# Patient Record
Sex: FEMALE | Race: OTHER | Employment: OTHER | URBAN - METROPOLITAN AREA
[De-identification: names, ages, dates, MRNs, and addresses within clinical notes are randomized per-mention and may not be internally consistent; named-entity substitution may affect disease eponyms.]

---

## 2017-07-06 ENCOUNTER — OFFICE VISIT (OUTPATIENT)
Dept: INTERNAL MEDICINE CLINIC | Age: 67
End: 2017-07-06

## 2017-07-06 VITALS
HEART RATE: 67 BPM | RESPIRATION RATE: 17 BRPM | SYSTOLIC BLOOD PRESSURE: 138 MMHG | TEMPERATURE: 97 F | OXYGEN SATURATION: 99 % | WEIGHT: 115.4 LBS | HEIGHT: 62 IN | DIASTOLIC BLOOD PRESSURE: 69 MMHG | BODY MASS INDEX: 21.23 KG/M2
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
## 2017-07-06 DIAGNOSIS — R00.1 BRADYCARDIA: ICD-10-CM

## 2017-07-06 DIAGNOSIS — Z12.11 COLON CANCER SCREENING: ICD-10-CM

## 2017-07-06 DIAGNOSIS — E78.5 DYSLIPIDEMIA: ICD-10-CM

## 2017-07-06 DIAGNOSIS — Z87.898 HISTORY OF PREDIABETES: ICD-10-CM

## 2017-07-06 DIAGNOSIS — R00.2 HEART PALPITATIONS: ICD-10-CM

## 2017-07-06 DIAGNOSIS — M85.80 OSTEOPENIA, UNSPECIFIED LOCATION: ICD-10-CM

## 2017-07-06 DIAGNOSIS — Z11.59 NEED FOR HEPATITIS C SCREENING TEST: ICD-10-CM

## 2017-07-06 RX ORDER — ESTRADIOL 0.1 MG/G
CREAM VAGINAL
COMMUNITY
Start: 2017-06-09 | End: 2018-10-08

## 2017-07-06 NOTE — PROGRESS NOTES
Chief Complaint   Patient presents with   [facility] Establish Care     1. Have you been to the ER, urgent care clinic since your last visit? Hospitalized since your last visit? No    2. Have you seen or consulted any other health care providers outside of the [facility] since your last visit? Include any pap smears or colon screening.  No

## 2017-07-06 NOTE — PROGRESS NOTES
Chief Complaint   Patient presents with    Establish Care       Subjective:   Duc Dunbar 78 y.o.  female with a  past medical history reviewed see below. comes in today to re-establish care. She is upset as she was not seeing a doctor and was only seeing a NP and felt like a number at her old practice. Her health is good. Due pt a syncopal episode in 2014 after she left the hospital she was told to stop the Nathaniel Churchman  She had a heart loop monitor in 2013 and removed in 2015. She was told she has bradycardia as well. She is concerned about her blood sugars as well as she was told she was prediabetic and has been trying to control her diabetic intake and eating a semi vegitariean diet. Reviewed notes from chart see nursing notes - pt FELL twice in our waiting room  She has an appt for aug 8th for mammogram and dexa scan  She had coffee this am but no foods   She is still working as well and walking 10,000  To 12,000 steps  She has had some bit of stress with  dx with prostate cancer  Pt did not have a pacemaker but did have a ILR  (implantable loop recorder)   She was treated for a UTI and the pain was worse she went to a gynecoloigst for burning pain and saw her June 7th  She was dx with vaginal atrophy she was prescribed a small dose of estrogen     ROS: sometimes when she is home she feels like her heart is fluttering and skipping a beat she has no appt set up with cardiology and was told she has \"bradycardia\"   otherwise feeling generally well. All other systems reviewed and are negative      Current Outpatient Prescriptions   Medication Sig Dispense Refill    OMEGA-3S/DHA/EPA/FISH OIL (OMEGA 3 PO) Take 1 Cap by mouth two (2) times a day.  multivitamin (ONE A DAY) tablet Take 1 Tab by mouth daily.  aspirin 81 mg tablet Take 81 mg by mouth daily.  calcium-cholecalciferol, d3, (CALCIUM 600 + D) 600-125 mg-unit Tab Take 1 Tab by mouth every other day.        Allergies   Allergen Reactions    Codeine Other (comments)     DROP IN BP    Opioids - Morphine Analogues Other (comments)     BP drops.     Tape [Adhesive] Other (comments)     rash     Past Medical History:   Diagnosis Date    Arrhythmia     Hyperlipidemia     Osteopenia     Tendonitis of foot     acute L foot     Past Surgical History:   Procedure Laterality Date    BREAST SURGERY PROCEDURE UNLISTED      left breast     HX GYN      fibroid from uterus in 25    HX HEENT      wisdom teeth removed age 27    HX ORTHOPAEDIC      rotator cuff right shoulder     HX OTHER SURGICAL  01/2014    heart loop monitor    AK COLONOSCOPY FLX DX W/COLLJ SPEC WHEN PFRMD  2011    diverticulitis f/up 10 yrs      Family History   Problem Relation Age of Onset    Coronary Artery Disease Mother     Dementia Mother     Heart Disease Mother      cabg     Hypertension Mother     Headache Mother      a fib   Jacy Asa Elevated Lipids Mother     Coronary Artery Disease Father     Broken Bones Father     Cancer Father      prostate ca     Heart Disease Father     Hypertension Father     Coronary Artery Disease Brother     Hypertension Brother     Elevated Lipids Brother     Coronary Artery Disease Sister     Hypertension Sister     Elevated Lipids Sister     Cancer Sister      thyroid ca and breast ca     Heart Attack Maternal Aunt     Heart Attack Maternal Uncle     Cancer Maternal Uncle      breast     Broken Bones Paternal Aunt     Broken Bones Paternal Uncle     Hypertension Sister     Elevated Lipids Sister      Social History   Substance Use Topics    Smoking status: Never Smoker    Smokeless tobacco: Never Used    Alcohol use No          Objective:     Visit Vitals    Pulse 67    Temp 97 °F (36.1 °C) (Oral)    Resp 17    Ht 5' 2\" (1.575 m)    Wt 115 lb 6.4 oz (52.3 kg)    SpO2 99%    BMI 21.11 kg/m2     Gen: NAD, pleasant, thin   HEENT: normal appearing head, nares patent, PERRLA, EOMI, oropharynx no erythema, no cervical lymphadenopathy neck supple   Cardio: RRR nl S1S2 no murmur  Lungs CTAB no wheeze no rales no rhonchi  ABD Soft non tender non distended + bowel sounds  Extremities: full ROM X 4 no clubbing no cyanosis erythema on left knee and right elbow very mild no abrasion full rom mild crpkeitation noted in lef tknee   Neuro: no gross focal deficits noted, alert and orientated X 3  Psych.: well groomed no outward signs of depression. Assessment/Plan:   Dacia Major was seen today for establish care. Diagnoses and all orders for this visit:    Fall (on)(from) incline, initial encounter  -     HEMOGLOBIN A1C WITH EAG  -     LIPID PANEL  -     METABOLIC PANEL, COMPREHENSIVE  -     VITAMIN D, 25 HYDROXY    Bradycardia  -     HEMOGLOBIN A1C WITH EAG  -     LIPID PANEL  -     METABOLIC PANEL, COMPREHENSIVE  -     VITAMIN D, 25 HYDROXY  -     THYROID PANEL W/TSH  -     ANEMIA PROFILE A    Dyslipidemia  -     HEMOGLOBIN A1C WITH EAG  -     LIPID PANEL  -     METABOLIC PANEL, COMPREHENSIVE  -     VITAMIN D, 25 HYDROXY  -     THYROID PANEL W/TSH    Osteopenia, unspecified location  -     HEMOGLOBIN A1C WITH EAG  -     LIPID PANEL  -     METABOLIC PANEL, COMPREHENSIVE  -     VITAMIN D, 25 HYDROXY    History of prediabetes  -     HEMOGLOBIN A1C WITH EAG  -     LIPID PANEL  -     METABOLIC PANEL, COMPREHENSIVE  -     VITAMIN D, 25 HYDROXY    Heart palpitations  -     THYROID PANEL W/TSH  -     ANEMIA PROFILE A    Need for hepatitis C screening test  -     HEPATITIS C AB    Colon cancer screening  -     OCCULT BLOOD, IMMUNOASSAY (FIT)      Follow-up Disposition:  Return in about 3 weeks (around 7/27/2017) for review labs and medicare wellness visit 30 min please . .she is to continue the fish and the asa and multivitamin and caution with other herbal medication   avs printed and given to the pt. .  She still really does not want to use prescribed medications d/w pt her prediab has resolved based on chart review of her labs and with her diet changes do not suspect she still has prediabeties pt declines rx for bradycardia and encouraged to follow up with cardiology as she has not been there for about a yr    The patient voiced understanding of the above. Medication side effects were reviewed with the patient. Call with any concerns.

## 2017-07-06 NOTE — MR AVS SNAPSHOT
Visit Information Date & Time Provider Department Dept. Phone Encounter #  
 7/6/2017 11:30 AM Kendrick Akhtar, [address] [address] [phone] [id number] Follow-up Instructions Return in about 3 weeks (around 7/27/2017) for review labs and medicare wellness visit 30 min please . Upcoming Health Maintenance Date Due Hepatitis C Screening 1950 FOBT Q 1 YEAR AGE 50-75 10/15/1999 Pneumococcal 65+ Low/Medium Risk (1 of 2 - PCV13) 10/15/2014 MEDICARE YEARLY EXAM 10/15/2014 GLAUCOMA SCREENING Q2Y 2/2/2017 INFLUENZA AGE 9 TO ADULT 8/1/2017 BREAST CANCER SCRN MAMMOGRAM 8/3/2018 DTaP/Tdap/Td series (2 - Td) 1/21/2022 Allergies as of 7/6/2017  Review Complete On: 7/6/2017 By: Kendrick Akhtar MD  
  
 Severity Noted Reaction Type Reactions Codeine  06/28/2012    Other (comments) DROP IN BP Opioids - Morphine Analogues  06/20/2016    Other (comments) BP drops. Tape [Adhesive]  06/10/2013    Other (comments)  
 rash Current Immunizations  Reviewed on 7/6/2017 Name Date Influenza Vaccine 10/1/2013 TDAP Vaccine 1/21/2012 Varicella Virus Vaccine Live 1/21/2012 Reviewed by Kendrick Akhtar MD on 7/6/2017 at 12:05 PM  
You Were Diagnosed With   
  
 Codes Comments Fall (on)(from) incline, initial encounter    -  Primary ICD-10-CM: W10. 2XXA ICD-9-CM: E880.9 Bradycardia     ICD-10-CM: R00.1 ICD-9-CM: 427.89 Dyslipidemia     ICD-10-CM: E78.5 ICD-9-CM: 272.4 Osteopenia, unspecified location     ICD-10-CM: M85.80 ICD-9-CM: 733.90 History of prediabetes     ICD-10-CM: Z87.898 ICD-9-CM: V12.29 Heart palpitations     ICD-10-CM: R00.2 ICD-9-CM: 785.1 Need for hepatitis C screening test     ICD-10-CM: Z11.59 
ICD-9-CM: V73.89 Colon cancer screening     ICD-10-CM: Z12.11 ICD-9-CM: V76.51 Vitals BP Pulse Temp Resp Height(growth percentile) Weight(growth percentile)  
 138/69 (BP 1 Location: Left arm, BP Patient Position: Sitting) 67 97 °F (36.1 °C) (Oral) 17 5' 2\" (1.575 m) 115 lb 6.4 oz (52.3 kg) SpO2 BMI OB Status Smoking Status 99% 21.11 kg/m2 Postmenopausal Never Smoker Vitals History BMI and BSA Data Body Mass Index Body Surface Area  
 21.11 kg/m 2 1.51 m 2 Preferred Pharmacy Pharmacy Name Phone [facility]uit[facility] [address] [place] [address], [address] [place] [address] [phone] Your Updated Medication List  
  
   
This list is accurate as of: 7/6/17 12:32 PM.  Always use your most recent med list.  
  
  
  
  
 aspirin 81 mg tablet Take 81 mg by mouth daily. CALCIUM 600 + D 600-125 mg-unit Tab Generic drug:  calcium-cholecalciferol (d3) Take 1 Tab by mouth every other day. ESTRACE 0.01 % (0.1 mg/gram) vaginal cream  
Generic drug:  estradiol  
  
 multivitamin tablet Commonly known as:  ONE A DAY Take 1 Tab by mouth daily. OMEGA 3 PO Take 1 Cap by mouth two (2) times a day. We Performed the Following ANEMIA PROFILE A R386819 CPT(R)] HEMOGLOBIN A1C WITH EAG [68333 CPT(R)] HEPATITIS C AB [97232 CPT(R)] LIPID PANEL [73148 CPT(R)] METABOLIC PANEL, COMPREHENSIVE [02970 CPT(R)] OCCULT BLOOD, IMMUNOASSAY (FIT) [id number] CPT(R)] THYROID PANEL W/TSH [66004 CPT(R)] VITAMIN D, 25 HYDROXY [id number] CPT(R)] Follow-up Instructions Return in about 3 weeks (around 7/27/2017) for review labs and medicare wellness visit 30 min please . To-Do List   
 08/08/2017 9:30 AM  
  Appointment with [facility] ANUM 1 at [place] [address] [address] ([phone]) Please, no calcium supplements or antacids that coat the stomach (ex: Tums, Mylanta) 24 hours prior to procedure. Maintain normal diet and medications. Dairy products are allowed.   Wear an outfit with an elastic waistband (no zipper or metal snaps). Check in at registration 15min before your appointment time unless you were instructed to do otherwise. 08/08/2017 10:10 AM  
  Appointment with [facility] JAMES 1 at [facility] ([phone]) Shower or bathe using soap and water. Do not use deodorant, powder, perfumes, or lotion the day of your exam.  If your prior mammograms were not performed at [place] [address] please bring films with you or forward prior images 2 days before your procedure. Check in at registration 15min before your appointment time unless you were instructed to do otherwise. A script is not necessary, but if you have one, please bring it on the day of the mammogram or have it faxed to the department. [facility] [phone] [facility]  [phone] [facility]  [phone] [facility] [phone] [address] [phone] [facility]! Dear Brittany Stewart: Thank you for requesting a [employer] account. Our records indicate that you already have an active [employer] account. You can access your account anytime at [email or web address] Did you know that you can access your hospital and ER discharge instructions at any time in [employer]? You can also review all of your test results from your hospital stay or ER visit. Additional Information If you have questions, please visit the Frequently Asked Questions section of the [employer] website at [email or web address] Remember, [employer] is NOT to be used for urgent needs. For medical emergencies, dial 911. Now available from your iPhone and Android! Please provide this summary of care documentation to your next provider. Your primary care clinician is listed as Viola Goldstein. If you have any questions after today's visit, please call [phone].

## 2017-07-06 NOTE — PROGRESS NOTES
Pt feel upon entering waiting area by tripping on incline. Able to get up on her own and walked to exam room with no assistance. Examined pt's left elbow and left knee. Both areas were red but no skin was broken and no bleeding. Pt stated \"i'm fine\". Elbow and knee were able to be bent and examined with no complaints from pt.

## 2017-07-07 LAB
25(OH)D3+25(OH)D2 SERPL-MCNC: 28.5 NG/ML (ref 30–100)
ALBUMIN SERPL-MCNC: 4.4 G/DL (ref 3.6–4.8)
ALBUMIN/GLOB SERPL: 1.6 {RATIO} (ref 1.2–2.2)
ALP SERPL-CCNC: 36 IU/L (ref 39–117)
ALT SERPL-CCNC: 21 IU/L (ref 0–32)
AST SERPL-CCNC: 23 IU/L (ref 0–40)
BASOPHILS # BLD AUTO: 0.1 X10E3/UL (ref 0–0.2)
BASOPHILS NFR BLD AUTO: 1 %
BILIRUB SERPL-MCNC: 0.5 MG/DL (ref 0–1.2)
BUN SERPL-MCNC: 14 MG/DL (ref 8–27)
BUN/CREAT SERPL: 24 (ref 12–28)
CALCIUM SERPL-MCNC: 9.5 MG/DL (ref 8.7–10.3)
CHLORIDE SERPL-SCNC: 99 MMOL/L (ref 96–106)
CHOLEST SERPL-MCNC: 186 MG/DL (ref 100–199)
CO2 SERPL-SCNC: 24 MMOL/L (ref 18–29)
CREAT SERPL-MCNC: 0.58 MG/DL (ref 0.57–1)
EOSINOPHIL # BLD AUTO: 0.1 X10E3/UL (ref 0–0.4)
EOSINOPHIL NFR BLD AUTO: 2 %
ERYTHROCYTE [DISTWIDTH] IN BLOOD BY AUTOMATED COUNT: 14 % (ref 12.3–15.4)
EST. AVERAGE GLUCOSE BLD GHB EST-MCNC: 114 MG/DL
FT4I SERPL CALC-MCNC: 1.9 (ref 1.2–4.9)
GLOBULIN SER CALC-MCNC: 2.7 G/DL (ref 1.5–4.5)
GLUCOSE SERPL-MCNC: 99 MG/DL (ref 65–99)
HBA1C MFR BLD: 5.6 % (ref 4.8–5.6)
HCT VFR BLD AUTO: 43.4 % (ref 34–46.6)
HCV AB S/CO SERPL IA: <0.1 S/CO RATIO (ref 0–0.9)
HDLC SERPL-MCNC: 59 MG/DL
HGB BLD-MCNC: 13.7 G/DL (ref 11.1–15.9)
IMM GRANULOCYTES # BLD: 0 X10E3/UL (ref 0–0.1)
IMM GRANULOCYTES NFR BLD: 0 %
INTERPRETATION, 910389: NORMAL
IRON SATN MFR SERPL: 14 % (ref 15–55)
IRON SERPL-MCNC: 56 UG/DL (ref 27–139)
LDLC SERPL CALC-MCNC: 106 MG/DL (ref 0–99)
LYMPHOCYTES # BLD AUTO: 1.4 X10E3/UL (ref 0.7–3.1)
LYMPHOCYTES NFR BLD AUTO: 22 %
MCH RBC QN AUTO: 28.4 PG (ref 26.6–33)
MCHC RBC AUTO-ENTMCNC: 31.6 G/DL (ref 31.5–35.7)
MCV RBC AUTO: 90 FL (ref 79–97)
MONOCYTES # BLD AUTO: 0.3 X10E3/UL (ref 0.1–0.9)
MONOCYTES NFR BLD AUTO: 4 %
NEUTROPHILS # BLD AUTO: 4.4 X10E3/UL (ref 1.4–7)
NEUTROPHILS NFR BLD AUTO: 71 %
PLATELET # BLD AUTO: 271 X10E3/UL (ref 150–379)
POTASSIUM SERPL-SCNC: 4.7 MMOL/L (ref 3.5–5.2)
PROT SERPL-MCNC: 7.1 G/DL (ref 6–8.5)
RBC # BLD AUTO: 4.83 X10E6/UL (ref 3.77–5.28)
RETICS/RBC NFR AUTO: 0.8 % (ref 0.6–2.6)
SODIUM SERPL-SCNC: 142 MMOL/L (ref 134–144)
T3RU NFR SERPL: 26 % (ref 24–39)
T4 SERPL-MCNC: 7.3 UG/DL (ref 4.5–12)
TIBC SERPL-MCNC: 396 UG/DL (ref 250–450)
TRIGL SERPL-MCNC: 106 MG/DL (ref 0–149)
TSH SERPL DL<=0.005 MIU/L-ACNC: 0.73 UIU/ML (ref 0.45–4.5)
UIBC SERPL-MCNC: 340 UG/DL (ref 118–369)
VLDLC SERPL CALC-MCNC: 21 MG/DL (ref 5–40)
WBC # BLD AUTO: 6.2 X10E3/UL (ref 3.4–10.8)

## 2017-08-08 ENCOUNTER — HOSPITAL ENCOUNTER (OUTPATIENT)
Dept: BONE DENSITY | Age: 67
Discharge: HOME | End: 2017-08-08
Payer: MEDICARE

## 2017-08-08 ENCOUNTER — HOSPITAL ENCOUNTER (OUTPATIENT)
Dept: MAMMOGRAPHY | Age: 67
Discharge: HOME | End: 2017-08-08
Payer: MEDICARE

## 2017-08-08 DIAGNOSIS — M81.0 OSTEOPOROSIS: ICD-10-CM

## 2017-08-08 DIAGNOSIS — Z12.31 VISIT FOR SCREENING MAMMOGRAM: ICD-10-CM

## 2017-08-08 PROCEDURE — 77067 SCR MAMMO BI INCL CAD: CPT

## 2017-08-08 PROCEDURE — 77080 DXA BONE DENSITY AXIAL: CPT

## 2017-08-16 LAB
HEMOCCULT STL QL IA: NEGATIVE
PLEASE NOTE:, 188601: NORMAL

## 2018-03-09 ENCOUNTER — OFFICE VISIT (OUTPATIENT)
Dept: CARDIOLOGY CLINIC | Age: 68
End: 2018-03-09

## 2018-03-09 VITALS
WEIGHT: 115.8 LBS | HEIGHT: 62 IN | RESPIRATION RATE: 16 BRPM | OXYGEN SATURATION: 98 % | SYSTOLIC BLOOD PRESSURE: 150 MMHG | HEART RATE: 80 BPM | BODY MASS INDEX: 21.31 KG/M2 | DIASTOLIC BLOOD PRESSURE: 80 MMHG

## 2018-03-09 DIAGNOSIS — R94.31 ABNORMAL FINDING ON EKG: ICD-10-CM

## 2018-03-09 DIAGNOSIS — Z01.810 PREOP CARDIOVASCULAR EXAM: ICD-10-CM

## 2018-03-09 DIAGNOSIS — R55 SYNCOPE AND COLLAPSE: Primary | ICD-10-CM

## 2018-03-09 RX ORDER — MOXIFLOXACIN 5 MG/ML
1 SOLUTION/ DROPS OPHTHALMIC
COMMUNITY
Start: 2018-02-17 | End: 2018-10-08

## 2018-03-09 RX ORDER — PREDNISOLONE ACETATE 10 MG/ML
1 SUSPENSION/ DROPS OPHTHALMIC
COMMUNITY
Start: 2018-02-17 | End: 2018-10-08

## 2018-03-09 NOTE — PROGRESS NOTES
1. Have you been to the ER, urgent care clinic since your last visit? Hospitalized since your last visit? Yes When: 12/2017 urgent care right shoulder discomfort    2. Have you seen or consulted any other health care providers outside of the [address] [place] [address] since your last visit? Include any pap smears or colon screening.  Yes Jasmyn Petersen 2/18    Patient states she has an episode in Jan where she loss conscious lasted a few seconds noted pulse 50 BPM she is requesting cardiac clearance for cataract surgery 3/15/218

## 2018-03-09 NOTE — MR AVS SNAPSHOT
Perla Gamboaar [address] [place] [address] 
[phone] Patient: Erick Gonsalez MRN: [id number] Visit Information Date & Time Provider Department Dept. Phone Encounter #  
 3/9/2018 10:45 AM 1700 [facility], [address] [place] [address] [facility] [phone] [id number] Upcoming Health Maintenance Date Due Pneumococcal 65+ Low/Medium Risk (1 of 2 - PCV13) 10/15/2014 MEDICARE YEARLY EXAM 10/15/2014 GLAUCOMA SCREENING Q2Y 2/2/2017 Influenza Age 5 to Adult 8/1/2017 FOBT Q 1 YEAR AGE 50-75 8/14/2018 BREAST CANCER SCRN MAMMOGRAM 8/8/2019 DTaP/Tdap/Td series (2 - Td) 1/21/2022 Allergies as of 3/9/2018  Review Complete On: 3/9/2018 By: Thania Gotti LPN Severity Noted Reaction Type Reactions Codeine  06/28/2012    Other (comments) DROP IN BP Opioids - Morphine Analogues  06/20/2016    Other (comments) BP drops. Tape [Adhesive]  06/10/2013    Other (comments)  
 rash Current Immunizations  Reviewed on 7/6/2017 Name Date Influenza Vaccine 10/1/2013 TDAP Vaccine 1/21/2012 Varicella Virus Vaccine Live 1/21/2012 Not reviewed this visit You Were Diagnosed With   
  
 Codes Comments Syncope and collapse    -  Primary ICD-10-CM: R55 
ICD-9-CM: 780. 2 Abnormal finding on EKG     ICD-10-CM: R94.31 
ICD-9-CM: 794.31 Preop cardiovascular exam     ICD-10-CM: Z01.810 ICD-9-CM: V72.81 Vitals BP Pulse Resp Height(growth percentile) Weight(growth percentile) SpO2  
 150/80 (BP 1 Location: Left arm, BP Patient Position: Standing) 80 16 5' 2\" (1.575 m) 115 lb 12.8 oz (52.5 kg) 98% BMI OB Status Smoking Status 21.18 kg/m2 Postmenopausal Never Smoker Vitals History BMI and BSA Data Body Mass Index Body Surface Area  
 21.18 kg/m 2 1.52 m 2 Preferred Pharmacy Pharmacy Name Phone Carlia Page [address], [address] [facility] [phone] Your Updated Medication List  
  
   
This list is accurate as of 3/9/18 12:03 PM.  Always use your most recent med list.  
  
  
  
  
 aspirin 81 mg tablet Take 81 mg by mouth daily. CALCIUM 600 + D 600-125 mg-unit Tab Generic drug:  calcium-cholecalciferol (d3) Take 1 Tab by mouth every other day. ESTRACE 0.01 % (0.1 mg/gram) vaginal cream  
Generic drug:  estradiol  
  
 moxifloxacin 0.5 % ophthalmic solution Commonly known as:  Sara Love Administer 1 Drop to left eye.  
  
 multivitamin tablet Commonly known as:  ONE A DAY Take 1 Tab by mouth daily. OMEGA 3 PO Take 1 Cap by mouth two (2) times a day. prednisoLONE acetate 1 % ophthalmic suspension Commonly known as:  PRED FORTE Administer 1 Drop to left eye. We Performed the Following AMB POC EKG ROUTINE W/ 12 LEADS, INTER & REP [66259 CPT(R)] Introducing [facility]! Dear Nate Crews: Thank you for requesting a [employer] account. Our records indicate that you already have an active [employer] account. You can access your account anytime at [email or web address] Did you know that you can access your hospital and ER discharge instructions at any time in [employer]? You can also review all of your test results from your hospital stay or ER visit. Additional Information If you have questions, please visit the Frequently Asked Questions section of the [employer] website at [email or web address] Remember, [employer] is NOT to be used for urgent needs. For medical emergencies, dial 911. Now available from your iPhone and Android! Please provide this summary of care documentation to your next provider. Your primary care clinician is listed as Kd Rojas. If you have any questions after today's visit, please call [phone].

## 2018-03-09 NOTE — PROGRESS NOTES
[address] [address] [address] [facility]  [phone]     Subjective:      Vidhya Del Valle is a 75 y.o. female is here to seek cardiac clearance for cataract surgery. Medical hx include bradycardia, syncope post ILR. Was last seen in our practice in 6/16. Since last visit, denies chest pain/ shortness of breath, orthopnea, PND, LE edema, palpitations, syncope, or presyncope. Doing well. Patient Active Problem List    Diagnosis Date Noted    Shortness of breath 10/22/2015    Encounter for loop recorder check 09/09/2014    Abnormal finding on EKG 05/19/2014    Syncope and collapse 01/10/2014    Bradycardia 01/10/2014    Elevated blood pressure 04/29/2013    Osteopenia 12/19/2012    History of left breast biopsy 11/19/2012    Family history of breast cancer 11/19/2012    Dyslipidemia 08/21/2012      Gee Lunsford MD  Past Medical History:   Diagnosis Date    Arrhythmia     Hyperlipidemia     Osteopenia     Tendonitis of foot     acute L foot      Past Surgical History:   Procedure Laterality Date    BREAST SURGERY PROCEDURE UNLISTED      left breast     HX BREAST BIOPSY Left     28 yrs ago negative    HX GYN      fibroid from uterus in 1987    HX HEENT      wisdom teeth removed age 27    HX ORTHOPAEDIC      rotator cuff right shoulder     HX OTHER SURGICAL  01/2014    heart loop monitor    IN COLONOSCOPY FLX DX W/COLLJ SPEC WHEN PFRMD  2011    diverticulitis f/up 10 yrs      Allergies   Allergen Reactions    Codeine Other (comments)     DROP IN BP    Opioids - Morphine Analogues Other (comments)     BP drops.     Tape [Adhesive] Other (comments)     rash      Family History   Problem Relation Age of Onset    Coronary Artery Disease Mother     Dementia Mother     Heart Disease Mother      cabg     Hypertension Mother     Headache Mother      a fib   [facility] Elevated Lipids Mother     Coronary Artery Disease Father     Broken Bones Father     Cancer Father      prostate ca     Heart Disease Father     Hypertension Father     Coronary Artery Disease Brother     Hypertension Brother     Elevated Lipids Brother     Coronary Artery Disease Sister     Hypertension Sister     Elevated Lipids Sister     Cancer Sister      thyroid ca and breast ca     Breast Cancer Sister 52    Heart Attack Maternal Aunt     Heart Attack Maternal Uncle     Cancer Maternal Uncle      breast     Broken Bones Paternal Aunt     Broken Bones Paternal Uncle     Hypertension Sister     Elevated Lipids Sister       Social History     Social History    Marital status:      Spouse name: viji talamantes give infor    Number of children: 0    Years of education: N/A     Occupational History          quality insurance [employer]      Social History Main Topics    Smoking status: Never Smoker    Smokeless tobacco: Never Used    Alcohol use No    Drug use: No    Sexual activity: Yes     Partners: Male      Comment:       Other Topics Concern    Not on file     Social History Narrative      Current Outpatient Prescriptions   Medication Sig    moxifloxacin (VIGAMOX) 0.5 % ophthalmic solution Administer 1 Drop to left eye.  prednisoLONE acetate (PRED FORTE) 1 % ophthalmic suspension Administer 1 Drop to left eye.  OMEGA-3S/DHA/EPA/FISH OIL (OMEGA 3 PO) Take 1 Cap by mouth two (2) times a day.  multivitamin (ONE A DAY) tablet Take 1 Tab by mouth daily.  aspirin 81 mg tablet Take 81 mg by mouth daily.  calcium-cholecalciferol, d3, (CALCIUM 600 + D) 600-125 mg-unit Tab Take 1 Tab by mouth every other day.  ESTRACE 0.01 % (0.1 mg/gram) vaginal cream      No current facility-administered medications for this visit.           Review of Symptoms:  11 systems reviewed, negative other than as stated in the HPI    Physical ExamPhysical Exam:    Vitals:    03/09/18 1108 03/09/18 1109 03/09/18 1110   BP: 140/80 150/80 150/80   Pulse: 70 70 80 Resp: 16     SpO2: 98% 98% 98%   Weight: 115 lb 12.8 oz (52.5 kg)     Height: 5' 2\" (1.575 m)       Body mass index is 21.18 kg/(m^2). General PE   Gen:  NAD  Mental Status - Alert. General Appearance - Not in acute distress. Chest and Lung Exam   Inspection: Accessory muscles - No use of accessory muscles in breathing. Auscultation:   Breath sounds: - Normal.   Cardiovascular   Inspection: Jugular vein - Bilateral - Inspection Normal.   Palpation/Percussion:   Apical Impulse: - Normal.   Auscultation: Rhythm - Regular. Heart Sounds - S1 WNL and S2 WNL. No S3 or S4. Murmurs & Other Heart Sounds: Auscultation of the heart reveals - No Murmurs. Peripheral Vascular   Upper Extremity: Inspection - Bilateral - No Cyanotic nailbeds or Digital clubbing. Lower Extremity:   Palpation: Edema - Bilateral - No edema. Abdomen:   Soft, non-tender, bowel sounds are active.   Neuro: A&O times 3, CN and motor grossly WNL    Labs:   Lab Results   Component Value Date/Time    Cholesterol, total 186 07/06/2017 12:43 PM    Cholesterol, total 138 01/11/2014 03:10 AM    Cholesterol, total 152 10/15/2013 09:15 AM    Cholesterol, total 143 06/10/2013 08:37 AM    Cholesterol, total 147 12/19/2012 09:12 AM    HDL Cholesterol 59 07/06/2017 12:43 PM    HDL Cholesterol 44 01/11/2014 03:10 AM    HDL Cholesterol 48 10/15/2013 09:15 AM    HDL Cholesterol 51 06/10/2013 08:37 AM    HDL Cholesterol 54 12/19/2012 09:12 AM    LDL, calculated 106 (H) 07/06/2017 12:43 PM    LDL, calculated 79.6 01/11/2014 03:10 AM    LDL, calculated 88 10/15/2013 09:15 AM    LDL, calculated 74 06/10/2013 08:37 AM    LDL, calculated 82 12/19/2012 09:12 AM    Triglyceride 106 07/06/2017 12:43 PM    Triglyceride 72 01/11/2014 03:10 AM    Triglyceride 78 10/15/2013 09:15 AM    Triglyceride 92 06/10/2013 08:37 AM    Triglyceride 56 12/19/2012 09:12 AM    CHOL/HDL Ratio 3.1 01/11/2014 03:10 AM     Lab Results   Component Value Date/Time    CK 84 01/10/2014 11:15 AM Lab Results   Component Value Date/Time    Sodium 142 07/06/2017 12:43 PM    Potassium 4.7 07/06/2017 12:43 PM    Chloride 99 07/06/2017 12:43 PM    CO2 24 07/06/2017 12:43 PM    Anion gap 6 01/11/2014 03:10 AM    Glucose 99 07/06/2017 12:43 PM    BUN 14 07/06/2017 12:43 PM    Creatinine 0.58 07/06/2017 12:43 PM    BUN/Creatinine ratio 24 07/06/2017 12:43 PM    GFR est  07/06/2017 12:43 PM    GFR est non-AA 96 07/06/2017 12:43 PM    Calcium 9.5 07/06/2017 12:43 PM    Bilirubin, total 0.5 07/06/2017 12:43 PM    AST (SGOT) 23 07/06/2017 12:43 PM    Alk. phosphatase 36 (L) 07/06/2017 12:43 PM    Protein, total 7.1 07/06/2017 12:43 PM    Albumin 4.4 07/06/2017 12:43 PM    Globulin 3.1 01/11/2014 03:10 AM    A-G Ratio 1.6 07/06/2017 12:43 PM    ALT (SGPT) 21 07/06/2017 12:43 PM       EKG:  NSR, ventricular rate 61, unchanged from previous tracings     Assessment:     Assessment:      1. Syncope and collapse    2. Abnormal finding on EKG    3. Preop cardiovascular exam        Orders Placed This Encounter    AMB POC EKG ROUTINE W/ 12 LEADS, INTER & REP     Order Specific Question:   Reason for Exam:     Answer:   routine    moxifloxacin (VIGAMOX) 0.5 % ophthalmic solution     Sig: Administer 1 Drop to left eye.  prednisoLONE acetate (PRED FORTE) 1 % ophthalmic suspension     Sig: Administer 1 Drop to left eye. Plan: This is a 76 YO female with hx bradycardia, syncope post ILR (removed in 2015) who presents here today per PCP's recommendation d/t abnormal EKG. BP slightly elevated in clinic but home BP fluctuates between 609T-630W systolic, and defers initiation of any anti HTN medications at this time. She does have ischemic changes on her EKG which are chronic, unchanged from previous tracings. She had a negative cardiac cath in 2014. No cardiac complaints. She is of low cardiac risk for a noncardiac surgery and cleared to proceed for her cataract surgery. FLP followed by PCP. Continue current care and f/u in 6 months. Vic Olivas NP       [facility]    3/9/2018         Patient seen, examined by me personally. Plan discussed as detailed. Agree with note as outlined by  NP. I confirm findings in history and physical exam. No additional findings noted. Agree with plan as outlined above. Her EKG changes are chronic. She had coronary angiography with normal coronaries with same changes. Can proceed with surgery as planned. No further evaluation needed.     [address] Calhoun MD

## 2018-09-18 ENCOUNTER — HOSPITAL ENCOUNTER (OUTPATIENT)
Dept: LAB | Age: 68
Discharge: HOME | End: 2018-09-18
Payer: MEDICARE

## 2018-09-18 ENCOUNTER — OFFICE VISIT (OUTPATIENT)
Dept: CARDIOLOGY CLINIC | Age: 68
End: 2018-09-18

## 2018-09-18 VITALS
WEIGHT: 116.1 LBS | SYSTOLIC BLOOD PRESSURE: 158 MMHG | HEIGHT: 62 IN | RESPIRATION RATE: 18 BRPM | OXYGEN SATURATION: 97 % | BODY MASS INDEX: 21.36 KG/M2 | DIASTOLIC BLOOD PRESSURE: 82 MMHG | HEART RATE: 82 BPM

## 2018-09-18 DIAGNOSIS — E78.5 DYSLIPIDEMIA: ICD-10-CM

## 2018-09-18 DIAGNOSIS — R94.31 ABNORMAL FINDING ON EKG: ICD-10-CM

## 2018-09-18 DIAGNOSIS — R00.1 BRADYCARDIA: Primary | ICD-10-CM

## 2018-09-18 PROCEDURE — 82550 ASSAY OF CK (CPK): CPT

## 2018-09-18 PROCEDURE — 80076 HEPATIC FUNCTION PANEL: CPT

## 2018-09-18 PROCEDURE — 80061 LIPID PANEL: CPT

## 2018-09-18 PROCEDURE — 36415 COLL VENOUS BLD VENIPUNCTURE: CPT

## 2018-09-18 NOTE — PROGRESS NOTES
1. Have you been to the ER, urgent care clinic since your last visit? Hospitalized since your last visit? NO    2. Have you seen or consulted any other health care providers outside of the [facility] since your last visit? Include any pap smears or colon screening. YES, GI    C/O TIGHTNESS IN CHEST OFF AND ON ESPECIALLY WHEN WALKING.

## 2018-09-18 NOTE — PROGRESS NOTES
NAME:  Sheron Uriostegui   :      MRN:   [id number]   PCP:  Santo Barron MD           Subjective: The patient is a 75y.o. year old female  who returns for a routine follow-up. Since the last visit, patient reports no change in exercise tolerance, edema, medication intolerance, palpitations, shortness of breath, PND/orthopnea wheezing, sputum, syncope, dizziness or light headedness. Having trouble with vision after cataract surgery. Past Medical History:   Diagnosis Date    Arrhythmia     Hyperlipidemia     Osteopenia     Tendonitis of foot     acute L foot        ICD-10-CM ICD-9-CM    1. Bradycardia R00.1 427.89 AMB POC EKG ROUTINE W/ 12 LEADS, INTER & REP   2. Dyslipidemia E78.5 272.4 CK      LIPID PANEL      HEPATIC FUNCTION PANEL   3. Abnormal finding on EKG R94.31 794.31       Social History   Substance Use Topics    Smoking status: Never Smoker    Smokeless tobacco: Never Used    Alcohol use No      Family History   Problem Relation Age of Onset    Coronary Artery Disease Mother     Dementia Mother     Heart Disease Mother      cabg     Hypertension Mother     Headache Mother      a fib   [employer] Elevated Lipids Mother     Coronary Artery Disease Father     Broken Bones Father     Cancer Father      prostate ca     Heart Disease Father     Hypertension Father     Coronary Artery Disease Brother     Hypertension Brother     Elevated Lipids Brother     Coronary Artery Disease Sister     Hypertension Sister     Elevated Lipids Sister     Cancer Sister      thyroid ca and breast ca     Breast Cancer Sister 52    Heart Attack Maternal Aunt     Heart Attack Maternal Uncle     Cancer Maternal Uncle      breast     Broken Bones Paternal Aunt     Broken Bones Paternal Uncle     Hypertension Sister     Elevated Lipids Sister         Review of Systems  General: Pt denies excessive weight gain or loss.  Pt is able to conduct ADL's  HEENT: Denies blurred vision, headaches, epistaxis and difficulty swallowing. Respiratory: Denies shortness of breath, HOLLAND, wheezing or stridor. Cardiovascular: Denies precordial pain, palpitations, edema or PND  Gastrointestinal: Denies poor appetite, indigestion, abdominal pain or blood in stool  Musculoskeletal: Denies pain or swelling from muscles or joints  Neurologic: Denies tremor, paresthesias, or sensory motor disturbance  Skin: Denies rash, itching or texture change. Objective:       Vitals:    09/18/18 1007 09/18/18 1015   BP: 160/80 158/82   Pulse: 82    Resp: 18    SpO2: 97%    Weight: 116 lb 1.6 oz (52.7 kg)    Height: 5' 2\" (1.575 m)     Body mass index is 21.23 kg/(m^2). General PE  Mental Status - Alert. General Appearance - Not in acute distress. Chest and Lung Exam   Inspection: Accessory muscles - No use of accessory muscles in breathing. Auscultation:   Breath sounds: - Normal.    Cardiovascular   Inspection: Jugular vein - Bilateral - Inspection Normal.  Palpation/Percussion:   Apical Impulse: - Normal.  Auscultation: Rhythm - Regular. Heart Sounds - S1 WNL and S2 WNL. No S3 or S4. Murmurs & Other Heart Sounds: Auscultation of the heart reveals - No Murmurs. Peripheral Vascular   Upper Extremity: Inspection - Bilateral - No Cyanotic nailbeds or Digital clubbing. Lower Extremity:   Palpation: Edema - Bilateral - No edema. Data Review:     EKG -EKG: normal EKG, normal sinus rhythm, unchanged from previous tracings. Medications reviewed  Current Outpatient Prescriptions   Medication Sig    OMEGA-3S/DHA/EPA/FISH OIL (OMEGA 3 PO) Take 1 Cap by mouth two (2) times a day.  multivitamin (ONE A DAY) tablet Take 1 Tab by mouth daily.  calcium-cholecalciferol, d3, (CALCIUM 600 + D) 600-125 mg-unit Tab Take 1 Tab by mouth every other day.  moxifloxacin (VIGAMOX) 0.5 % ophthalmic solution Administer 1 Drop to left eye.     prednisoLONE acetate (PRED FORTE) 1 % ophthalmic suspension Administer 1 Drop to left eye.  ESTRACE 0.01 % (0.1 mg/gram) vaginal cream     aspirin 81 mg tablet Take 81 mg by mouth daily. No current facility-administered medications for this visit. Assessment:       ICD-10-CM ICD-9-CM    1. Bradycardia R00.1 427.89 AMB POC EKG ROUTINE W/ 12 LEADS, INTER & REP   2. Dyslipidemia E78.5 272.4 CK      LIPID PANEL      HEPATIC FUNCTION PANEL   3. Abnormal finding on EKG R94.31 794.31         Plan:     Patient presents doing well and stable from cardiac standpoint. Notes occassional Chest pain, not related to exertion. Has GI w/u pending. Cath normal 3 years ago. Continue current care and follow up in 3 months after GI evaluation oe sooner if symptoms worse.  Discussed stress test.    Tin Alford MD

## 2018-09-18 NOTE — MR AVS SNAPSHOT
[address] 
[phone] Patient: Bertha Meadows MRN: [id number] [place]:83/02/1920 Visit Information Date & Time Provider Department Dept. Phone Encounter #  
 9/18/2018  9:45 AM [address], MD [facility] [phone] [id number] Upcoming Health Maintenance Date Due Pneumococcal 65+ Low/Medium Risk (1 of 2 - PCV13) 10/15/2014 GLAUCOMA SCREENING Q2Y 2/2/2017 MEDICARE YEARLY EXAM 3/14/2018 Influenza Age 5 to Adult 8/1/2018 FOBT Q 1 YEAR AGE 50-75 8/14/2018 BREAST CANCER SCRN MAMMOGRAM 8/8/2019 DTaP/Tdap/Td series (2 - Td) 1/21/2022 Allergies as of 9/18/2018  Review Complete On: 9/18/2018 By: Larry Mcdaniel LPN Severity Noted Reaction Type Reactions Codeine  06/28/2012    Other (comments) DROP IN BP Opioids - Morphine Analogues  06/20/2016    Other (comments) BP drops. Tape [Adhesive]  06/10/2013    Other (comments)  
 rash Current Immunizations  Reviewed on 7/6/2017 Name Date Influenza Vaccine 10/1/2013 TDAP Vaccine 1/21/2012 Varicella Virus Vaccine Live 1/21/2012 Not reviewed this visit You Were Diagnosed With   
  
 Codes Comments Bradycardia    -  Primary ICD-10-CM: R00.1 ICD-9-CM: 427.89 Dyslipidemia     ICD-10-CM: E78.5 ICD-9-CM: 272.4 Abnormal finding on EKG     ICD-10-CM: R94.31 
ICD-9-CM: 794.31 Vitals BP Pulse Resp Height(growth percentile) Weight(growth percentile) SpO2  
 158/82 (BP 1 Location: Right arm, BP Patient Position: Sitting) 82 18 5' 2\" (1.575 m) 116 lb 1.6 oz (52.7 kg) 97% BMI OB Status Smoking Status 21.23 kg/m2 Postmenopausal Never Smoker Vitals History BMI and BSA Data Body Mass Index Body Surface Area  
 21.23 kg/m 2 1.52 m 2 Preferred Pharmacy Pharmacy Name Phone [facility] [address] [facility], [address] [facility] [phone] Your Updated Medication List  
  
   
This list is accurate as of 9/18/18 11:03 AM.  Always use your most recent med list.  
  
  
  
  
 aspirin 81 mg tablet Take 81 mg by mouth daily. CALCIUM 600 + D 600-125 mg-unit Tab Generic drug:  calcium-cholecalciferol (d3) Take 1 Tab by mouth every other day. ESTRACE 0.01 % (0.1 mg/gram) vaginal cream  
Generic drug:  estradiol  
  
 moxifloxacin 0.5 % ophthalmic solution Commonly known as:  Eleni Stage Administer 1 Drop to left eye.  
  
 multivitamin tablet Commonly known as:  ONE A DAY Take 1 Tab by mouth daily. OMEGA 3 PO Take 1 Cap by mouth two (2) times a day. prednisoLONE acetate 1 % ophthalmic suspension Commonly known as:  PRED FORTE Administer 1 Drop to left eye. We Performed the Following AMB POC EKG ROUTINE W/ 12 LEADS, INTER & REP [05062 CPT(R)] CK [id number] CPT(R)] HEPATIC FUNCTION PANEL [23237 CPT(R)] LIPID PANEL [05799 CPT(R)] Introducing [facility]! Dear Johnathan Cevallos: Thank you for requesting a [employer] account. Our records indicate that you already have an active [employer] account. You can access your account anytime at [email or web address] Did you know that you can access your hospital and ER discharge instructions at any time in [employer]? You can also review all of your test results from your hospital stay or ER visit. Additional Information If you have questions, please visit the Frequently Asked Questions section of the [employer] website at [email or web address] Remember, [employer] is NOT to be used for urgent needs. For medical emergencies, dial 911. Now available from your iPhone and Android! Please provide this summary of care documentation to your next provider. Your primary care clinician is listed as Viola Goldstein.  If you have any questions after today's visit, please call [phone].

## 2018-09-19 LAB
ALBUMIN SERPL-MCNC: 4.7 G/DL (ref 3.6–4.8)
ALP SERPL-CCNC: 38 IU/L (ref 39–117)
ALT SERPL-CCNC: 19 IU/L (ref 0–32)
AST SERPL-CCNC: 26 IU/L (ref 0–40)
BILIRUB DIRECT SERPL-MCNC: 0.21 MG/DL (ref 0–0.4)
BILIRUB SERPL-MCNC: 0.8 MG/DL (ref 0–1.2)
CHOLEST SERPL-MCNC: 174 MG/DL (ref 100–199)
CK SERPL-CCNC: 99 U/L (ref 24–173)
HDLC SERPL-MCNC: 57 MG/DL
INTERPRETATION, 910389: NORMAL
LDLC SERPL CALC-MCNC: 98 MG/DL (ref 0–99)
PROT SERPL-MCNC: 7.1 G/DL (ref 6–8.5)
TRIGL SERPL-MCNC: 93 MG/DL (ref 0–149)
VLDLC SERPL CALC-MCNC: 19 MG/DL (ref 5–40)

## 2018-10-08 ENCOUNTER — ANESTHESIA EVENT (OUTPATIENT)
Dept: ENDOSCOPY | Age: 68
End: 2018-10-08
Payer: MEDICARE

## 2018-10-08 RX ORDER — CALCIUM CARB/VITAMIN D3/VIT K1 500-500-40
400 TABLET,CHEWABLE ORAL DAILY
COMMUNITY

## 2018-10-08 NOTE — ANESTHESIA PREPROCEDURE EVALUATION
Anesthetic History   No history of anesthetic complications            Review of Systems / Medical History  Patient summary reviewed, nursing notes reviewed and pertinent labs reviewed    Pulmonary  Within defined limits                 Neuro/Psych   Within defined limits           Cardiovascular            Dysrhythmias   Hyperlipidemia      Comments: bradycardia   GI/Hepatic/Renal  Within defined limits              Endo/Other  Within defined limits           Other Findings            Physical Exam    Airway  Mallampati: I  TM Distance: 4 - 6 cm  Neck ROM: normal range of motion   Mouth opening: Normal     Cardiovascular  Regular rate and rhythm,  S1 and S2 normal,  no murmur, click, rub, or gallop             Dental  No notable dental hx       Pulmonary  Breath sounds clear to auscultation               Abdominal  GI exam deferred       Other Findings            Anesthetic Plan    ASA: 2  Anesthesia type: MAC          Induction: Intravenous  Anesthetic plan and risks discussed with: Patient

## 2018-10-09 ENCOUNTER — ANESTHESIA (OUTPATIENT)
Dept: ENDOSCOPY | Age: 68
End: 2018-10-09
Payer: MEDICARE

## 2018-10-09 ENCOUNTER — HOSPITAL ENCOUNTER (OUTPATIENT)
Age: 68
Setting detail: OUTPATIENT SURGERY
Discharge: HOME | End: 2018-10-09
Attending: INTERNAL MEDICINE | Admitting: INTERNAL MEDICINE
Payer: MEDICARE

## 2018-10-09 VITALS
TEMPERATURE: 97.5 F | RESPIRATION RATE: 17 BRPM | DIASTOLIC BLOOD PRESSURE: 64 MMHG | OXYGEN SATURATION: 100 % | SYSTOLIC BLOOD PRESSURE: 121 MMHG | HEART RATE: 67 BPM

## 2018-10-09 LAB
H PYLORI FROM TISSUE: NEGATIVE
KIT LOT NO., HCLOLOT: NORMAL
NEGATIVE CONTROL: NEGATIVE
POSITIVE CONTROL: POSITIVE

## 2018-10-09 PROCEDURE — 74011000250 HC RX REV CODE- 250

## 2018-10-09 PROCEDURE — 77030009426 HC FCPS BIOP ENDOSC BSC -B: Performed by: INTERNAL MEDICINE

## 2018-10-09 PROCEDURE — 77030027957 HC TBNG IRR ENDOGTR BUSS -B: Performed by: INTERNAL MEDICINE

## 2018-10-09 PROCEDURE — 76040000019: Performed by: INTERNAL MEDICINE

## 2018-10-09 PROCEDURE — 76060000031 HC ANESTHESIA FIRST 0.5 HR: Performed by: INTERNAL MEDICINE

## 2018-10-09 PROCEDURE — 74011250636 HC RX REV CODE- 250/636

## 2018-10-09 PROCEDURE — 74011250637 HC RX REV CODE- 250/637: Performed by: INTERNAL MEDICINE

## 2018-10-09 PROCEDURE — 87077 CULTURE AEROBIC IDENTIFY: CPT | Performed by: INTERNAL MEDICINE

## 2018-10-09 PROCEDURE — 88305 TISSUE EXAM BY PATHOLOGIST: CPT | Performed by: INTERNAL MEDICINE

## 2018-10-09 RX ORDER — SODIUM CHLORIDE 0.9 % (FLUSH) 0.9 %
5-10 SYRINGE (ML) INJECTION EVERY 8 HOURS
Status: DISCONTINUED | OUTPATIENT
Start: 2018-10-09 | End: 2018-10-09 | Stop reason: HOSPADM

## 2018-10-09 RX ORDER — SODIUM CHLORIDE 9 MG/ML
INJECTION, SOLUTION INTRAVENOUS
Status: DISCONTINUED | OUTPATIENT
Start: 2018-10-09 | End: 2018-10-09 | Stop reason: HOSPADM

## 2018-10-09 RX ORDER — EPINEPHRINE 0.1 MG/ML
1 INJECTION INTRACARDIAC; INTRAVENOUS
Status: DISCONTINUED | OUTPATIENT
Start: 2018-10-09 | End: 2018-10-09 | Stop reason: HOSPADM

## 2018-10-09 RX ORDER — PROPOFOL 10 MG/ML
INJECTION, EMULSION INTRAVENOUS AS NEEDED
Status: DISCONTINUED | OUTPATIENT
Start: 2018-10-09 | End: 2018-10-09 | Stop reason: HOSPADM

## 2018-10-09 RX ORDER — MIDAZOLAM HYDROCHLORIDE 1 MG/ML
.25-5 INJECTION, SOLUTION INTRAMUSCULAR; INTRAVENOUS
Status: DISCONTINUED | OUTPATIENT
Start: 2018-10-09 | End: 2018-10-09 | Stop reason: HOSPADM

## 2018-10-09 RX ORDER — SODIUM CHLORIDE 9 MG/ML
150 INJECTION, SOLUTION INTRAVENOUS CONTINUOUS
Status: DISCONTINUED | OUTPATIENT
Start: 2018-10-09 | End: 2018-10-09 | Stop reason: HOSPADM

## 2018-10-09 RX ORDER — DEXTROMETHORPHAN/PSEUDOEPHED 2.5-7.5/.8
1.2 DROPS ORAL
Status: DISCONTINUED | OUTPATIENT
Start: 2018-10-09 | End: 2018-10-09 | Stop reason: HOSPADM

## 2018-10-09 RX ORDER — GLYCOPYRROLATE 0.2 MG/ML
INJECTION INTRAMUSCULAR; INTRAVENOUS AS NEEDED
Status: DISCONTINUED | OUTPATIENT
Start: 2018-10-09 | End: 2018-10-09 | Stop reason: HOSPADM

## 2018-10-09 RX ORDER — SODIUM CHLORIDE 0.9 % (FLUSH) 0.9 %
5-10 SYRINGE (ML) INJECTION AS NEEDED
Status: DISCONTINUED | OUTPATIENT
Start: 2018-10-09 | End: 2018-10-09 | Stop reason: HOSPADM

## 2018-10-09 RX ORDER — LIDOCAINE HYDROCHLORIDE 20 MG/ML
INJECTION, SOLUTION EPIDURAL; INFILTRATION; INTRACAUDAL; PERINEURAL AS NEEDED
Status: DISCONTINUED | OUTPATIENT
Start: 2018-10-09 | End: 2018-10-09 | Stop reason: HOSPADM

## 2018-10-09 RX ORDER — ATROPINE SULFATE 0.1 MG/ML
0.5 INJECTION INTRAVENOUS
Status: DISCONTINUED | OUTPATIENT
Start: 2018-10-09 | End: 2018-10-09 | Stop reason: HOSPADM

## 2018-10-09 RX ADMIN — PROPOFOL 40 MG: 10 INJECTION, EMULSION INTRAVENOUS at 08:04

## 2018-10-09 RX ADMIN — PROPOFOL 30 MG: 10 INJECTION, EMULSION INTRAVENOUS at 08:15

## 2018-10-09 RX ADMIN — PROPOFOL 30 MG: 10 INJECTION, EMULSION INTRAVENOUS at 08:12

## 2018-10-09 RX ADMIN — LIDOCAINE HYDROCHLORIDE 100 MG: 20 INJECTION, SOLUTION EPIDURAL; INFILTRATION; INTRACAUDAL; PERINEURAL at 07:58

## 2018-10-09 RX ADMIN — SODIUM CHLORIDE: 9 INJECTION, SOLUTION INTRAVENOUS at 07:35

## 2018-10-09 RX ADMIN — PROPOFOL 60 MG: 10 INJECTION, EMULSION INTRAVENOUS at 07:58

## 2018-10-09 RX ADMIN — PROPOFOL 40 MG: 10 INJECTION, EMULSION INTRAVENOUS at 08:01

## 2018-10-09 RX ADMIN — PROPOFOL 30 MG: 10 INJECTION, EMULSION INTRAVENOUS at 08:08

## 2018-10-09 RX ADMIN — GLYCOPYRROLATE 0.2 MG: 0.2 INJECTION INTRAMUSCULAR; INTRAVENOUS at 07:58

## 2018-10-09 NOTE — H&P
[address], [address] [place] [address]          Pre-procedure History and Physical       NAME:  Bruce Restrepo   :   1950   MRN:   [id number]     CHIEF COMPLAINT/HPI: See procedure note    PMH:  Past Medical History:   Diagnosis Date    Arrhythmia     bradycardia    Hyperlipidemia     Ill-defined condition     dyslipidemia    Osteopenia     Tendonitis of foot     acute L foot       PSH:  Past Surgical History:   Procedure Laterality Date    HX BREAST BIOPSY Left     28 yrs ago negative    HX CATARACT REMOVAL Bilateral     HX GYN      fibroid from uterus in     HX HEENT      wisdom teeth removed age 27    HX ORTHOPAEDIC      rotator cuff right shoulder     HX OTHER SURGICAL  2014    heart loop monitor    [place] COLONOSCOPY FLX DX W/COLLJ SPEC WHEN PFRMD      diverticulitis f/up 10 yrs        Allergies: Allergies   Allergen Reactions    Codeine Other (comments)     DROP IN BP    Opioids - Morphine Analogues Other (comments)     BP drops.  Tape [Adhesive] Other (comments)     rash       Home Medications:  Prior to Admission Medications   Prescriptions Last Dose Informant Patient Reported? Taking? MULTIVITAMIN PO 10/5/2018  Yes Yes   Sig: Take  by mouth. Takes one po once daily. OMEGA-3S/DHA/EPA/FISH OIL (OMEGA 3 PO) 10/5/2018  Yes Yes   Sig: Take 1,280 mg by mouth daily. calcium citrate/vitamin D3 (CALCIUM CITRATE + D PO) 10/5/2018  Yes Yes   Sig: Take 250 mg by mouth daily. cholecalciferol, vitamin d3, (VITAMIN D3) 400 unit cap 10/5/2018  Yes Yes   Sig: Take 400 Units by mouth daily. light mineral oil-mineral oil (SOOTHE XP) 1-4.5 % drop ophthalmic solution 10/2/2018 at Unknown time  Yes Yes   Sig: Administer 1 Drop to both eyes as needed. vit C/E/Zn/coppr/lutein/zeaxan (PRESERVISION AREDS-2 PO) 10/2/2018 at Unknown time  Yes Yes   Sig: Take 1 Cap by mouth daily.       Facility-Administered Medications: None       [facility] Medications:  Current Facility-Administered Medications   Medication Dose Route Frequency    0.9% sodium chloride infusion  150 mL/hr IntraVENous CONTINUOUS    sodium chloride (NS) flush 5-10 mL  5-10 mL IntraVENous Q8H    sodium chloride (NS) flush 5-10 mL  5-10 mL IntraVENous PRN    midazolam (VERSED) injection 0.25-5 mg  0.25-5 mg IntraVENous Multiple    simethicone (MYLICON) 06QN/8.7CP oral drops 80 mg  1.2 mL Oral Multiple    atropine injection 0.5 mg  0.5 mg IntraVENous ONCE PRN    EPINEPHrine (ADRENALIN) 0.1 mg/mL syringe 1 mg  1 mg Endoscopically ONCE PRN     Facility-Administered Medications Ordered in Other Encounters   Medication Dose Route Frequency    0.9% sodium chloride infusion   IntraVENous CONTINUOUS       Family History:  Family History   Problem Relation Age of Onset    Dementia Mother     Hypertension Mother     Headache Mother     Elevated Lipids Mother     Stroke Mother     Coronary Artery Disease Father     Cancer Father      prostate ca     Heart Disease Father      CABG    Hypertension Father     Coronary Artery Disease Brother     Hypertension Brother     Elevated Lipids Brother     Coronary Artery Disease Sister     Hypertension Sister     Elevated Lipids Sister     Cancer Sister      thyroid ca and breast ca     Breast Cancer Sister 52    Stroke Sister     Heart Attack Maternal Aunt     Heart Attack Maternal Uncle     Cancer Maternal Uncle      breast     Broken Bones Paternal Aunt     Broken Bones Paternal Uncle     Hypertension Sister     Elevated Lipids Sister        Social History:  Social History   Substance Use Topics    Smoking status: Never Smoker    Smokeless tobacco: Never Used    Alcohol use No         PHYSICAL EXAM PRIOR TO SEDATION:  General: Alert, in no acute distress    Lungs:            CTA bilaterally  Heart:  Normal S1, S2    Abdomen: Soft, Non distended, Non tender. Normoactive bowel sounds.       Assessment:   Stable for sedation administration.   Date of last colonoscopy: 2011, Polyps  No    Plan:   · Endoscopic procedure with sedation     Signed By: Phil Moralez MD     10/9/2018  7:53 AM

## 2018-10-09 NOTE — PROGRESS NOTES

## 2018-10-09 NOTE — ROUTINE PROCESS
Maxi Vallecillo  1950  [id number]    Situation:  Verbal report received from: Mildred Shetty RN  Procedure: Procedure(s):  COLONOSCOPY  ESOPHAGOGASTRODUODENOSCOPY (EGD)  ESOPHAGOGASTRODUODENAL (EGD) BIOPSY  COLON BIOPSY    Background:    Preoperative diagnosis: REFLUX, CHANGE IN BOWEL  Postoperative diagnosis: 1. Gastric Polyps  2. Diverticulosis    :  Dr. Michael Saucedo  Assistant(s): Endoscopy Technician-1: Papi Dallas  Endoscopy RN-1: Julito Del Angel RN    Specimens:   ID Type Source Tests Collected by Time Destination   1 : Duodenum Rule Out Celiac Disease Preservative   Homero Carrillo MD 10/9/2018 7899 Pathology   2 : Gastric Polyps Preservative   Homero Carrillo MD 10/9/2018 0802 Pathology   3 : Random Colon Biopsies Rule Out Microscopic Colitis Preservative   Homero Carrillo MD 10/9/2018 0813 Pathology     H. Pylori  yes    Assessment:  Intra-procedure medications   Anesthesia gave intra-procedure sedation and medications, see anesthesia flow sheet yes    Intravenous fluids: NS@ KVO     Vital signs stable     Abdominal assessment: round and soft     Recommendation:  Discharge patient per MD order.   Family   Permission to share finding with family or friend yes

## 2018-10-09 NOTE — PROCEDURES
Dennise Matos 912 Roel Aviles M.D.  [address] [place] [address]  [phone]               Colonoscopy Procedure Note    NAME: Alicia Kwong  :    MRN:  [id number]    Indications:   Change in bowel habits     : Dana Maldonado MD    Referring Provider:  Claudell Muss, MD    Medicines:  MAC anesthesia      Procedure Details:  After informed consent was obtained with all risks and benefits of the procedure explained and preprocedure exam completed, the patient was placed in the left lateral decubitus position. Universal protocol for patient identification was performed and documented in the nursing notes. Throughout the procedure, the patient's blood pressure was monitored at least every five minutes; pulse, and oxygen saturations were monitored continuously. All vital signs were documented in the nursing notes. A digital rectal exam was performed and was normal.  The Olympus videocolonoscope  was inserted in the rectum and carefully advanced to the terminal ileum. The colonoscope was slowly withdrawn with careful evaluation between folds. Retroflexion in the rectum was performed; findings and interventions are described below. Procedure start time, extent reached time/cecum time, and procedure end time are documented in the nursing notes. The quality of preparation was good.        Findings:   Rectum: normal  Sigmoid:     - Diverticulosis-mild  Descending Colon: normal  Transverse Colon: normal  Ascending Colon: normal  Cecum: normal  Terminal Ileum: normal    Interventions:    biopsy of colon colon    Specimens:     ID Type Source Tests Collected by Time Destination   1 : Duodenum Rule Out Celiac Disease Preservative   Dana Maldonado MD 10/9/2018 0759 Pathology   2 : Gastric Polyps Preservative   Dana Maldonado MD 10/9/2018 0802 Pathology   3 : Random Colon Biopsies Rule Out Microscopic Colitis Preservative   Dana Maldonado MD 10/9/2018 3464 Pathology       EBL:  None. Complications:   No immediate complications     Impression:  -As above. Random biopsies taken of the colon to evaluate for microscopic colitis. Recommendations:   -Await pathology. Recommendation for next colonoscopy in 10 years  Resume normal medication(s). Signed by:  Moraima Lala MD          10/9/2018  8:31 AM

## 2018-10-09 NOTE — IP AVS SNAPSHOT
[address] 
[phone] Patient: Marina Finley MRN: [id number] IWA:29/97/[birth year] About your hospitalization You were admitted on:  October 9, 2018 You last received care in the:  [address] [place] [address] ENDOSCOPY You were discharged on:  October 9, 2018 Why you were hospitalized Your primary diagnosis was:  Not on File Follow-up Information None Your Scheduled Appointments Tuesday December 04, 2018  9:00 AM EST  
ESTABLISHED PATIENT with Serjio Woodruff MD  
[facility] [address] [facility]  
[phone] Discharge Orders None A check melanie indicates which time of day the medication should be taken. My Medications CONTINUE taking these medications Instructions Each Dose to Equal  
 Morning Noon Evening Bedtime CALCIUM CITRATE + D PO Your last dose was: Your next dose is: Take 250 mg by mouth daily. 250 mg MULTIVITAMIN PO Your last dose was: Your next dose is: Take  by mouth. Takes one po once daily. OMEGA 3 PO Your last dose was: Your next dose is: Take 1,280 mg by mouth daily. 1280 mg PRESERVISION AREDS-2 PO Your last dose was: Your next dose is: Take 1 Cap by mouth daily. 1 Cap SOOTHE XP 1-4.5 % Drop ophthalmic solution Generic drug:  light mineral oil-mineral oil Your last dose was: Your next dose is:    
   
   
 Administer 1 Drop to both eyes as needed. 1 Drop VITAMIN D3 400 unit Cap Generic drug:  cholecalciferol (vitamin d3) Your last dose was: Your next dose is: Take 400 Units by mouth daily. 400 Units Discharge Instructions [address]  [address]. Linda Navarrete M.D. 
[address] [facility], [address] [place] [address] 
[phone] COLONOSCOPY DISCHARGE INSTRUCTIONS Shauna Brown [id number] 
1950 DISCOMFORT: Warm salt water gargle for sore throat Redness at IV site- apply warm compress to area; if redness or soreness persist- contact your physician There may be a slight amount of blood passed from the rectum/oral 
Gaseous discomfort- walking, belching will help relieve any discomfort You may not operate a vehicle for 12 hours You may not engage in an occupation involving machinery or appliances for the  rest of today You may not drink alcoholic beverages for at least 12 hours Avoid making any critical decisions for at least 24 hours DIET: 
 You may resume your normal diet, but some patients find that heavy or large  meals may lead to indigestion or vomiting. I suggest a light meal as first food  intake. I recommend a whole food, plant-based diet for your overall health. ACTIVITY: 
You may resume your normal daily activities. It is recommended that you spend the remainder of the day resting - avoid any strenuous activity. CALL SAVANA Portillo ANY SIGN OF: Increasing pain, nausea, vomiting Abdominal distension (swelling) Significant bleeding (oral or rectal) Fever Rogers Odell Pain in chest area Shortness of breath Additional Instructions: 
 Call Dr. Linda Navarrete if any questions or problems at [phone] You should receive the biopsy results by phone or mail within 3 weeks, if not, call  my office for the results Should have a repeat colonoscopy in 10 years. Colonoscopy showed mild diverticulosis. Random biopsies taken of the colon. EGD showed small stomach polyps-not the cause of pain. Biopsies taken. Setup follow-up office visit with Dr. Mckenna Sousa. Introducing [facility]!    
 Dear Rod Risk: 
 Thank you for requesting a [employer] account. Our records indicate that you already have an active [employer] account. You can access your account anytime at [email or web address] Did you know that you can access your hospital and ER discharge instructions at any time in [employer]? You can also review all of your test results from your hospital stay or ER visit. Additional Information If you have questions, please visit the Frequently Asked Questions section of the [employer] website at [email or web address] Remember, [employer] is NOT to be used for urgent needs. For medical emergencies, dial 911. Now available from your iPhone and Android! Introducing Jos Leavitt As a [employer] patient, I wanted to make you aware of our electronic visit tool called Jos Leavitt. [employer] 24/7 allows you to connect within minutes with a medical provider 24 hours a day, seven days a week via a mobile device or tablet or logging into a secure website from your computer. You can access Jos Villafin from anywhere in the [place]. A virtual visit might be right for you when you have a simple condition and feel like you just dont want to get out of bed, or cant get away from work for an appointment, when your regular [employer] provider is not available (evenings, weekends or holidays), or when youre out of town and need minor care. Electronic visits cost only $49 and if the [employer] 24/7 provider determines a prescription is needed to treat your condition, one can be electronically transmitted to a nearby pharmacy*. Please take a moment to enroll today if you have not already done so. The enrollment process is free and takes just a few minutes. To enroll, please download the [employer] 24/7 reid to your tablet or phone, or visit [email or web address] to enroll on your computer. And, as an [address] [place] [address] patient with a [employer] account, the results of your visits will be scanned into your electronic medical record and your primary care provider will be able to view the scanned results. We urge you to continue to see your regular [facility] provider for your ongoing medical care. And while your primary care provider may not be the one available when you seek a Jos Villafin virtual visit, the peace of mind you get from getting a real diagnosis real time can be priceless. For more information on Jos Villafin, view our Frequently Asked Questions (FAQs) at [email or web address]. Sincerely, 
 
Abi George MD 
Chief Medical Officer [facility] Phuong Rogers *:  certain medications cannot be prescribed via Jos Daisyfin Providers Seen During Your Hospitalization Provider Specialty Primary office phone Mac Farmer MD Gastroenterology [phone] Your Primary Care Physician (PCP) Primary Care Physician Office Phone Office Fax Adamaris Vital [phone] You are allergic to the following Allergen Reactions Codeine Other (comments) DROP IN BP Opioids - Morphine Analogues Other (comments) BP drops. Tape (Adhesive) Other (comments)  
 rash Recent Documentation Breastfeeding? OB Status Smoking Status No Postmenopausal Never Smoker Emergency Contacts Name Discharge Info Relation Home Work Mobile Ortega Kraus. Spouse [3] [phone] Patient Belongings The following personal items are in your possession at time of discharge: 
  Dental Appliances: None  Visual Aid: None Please provide this summary of care documentation to your next provider.  
  
  
 
  
Signatures-by signing, you are acknowledging that this After Visit Summary has been reviewed with you and you have received a copy. Patient Signature:  ____________________________________________________________ Date:  ____________________________________________________________  
  
Celester Rota Provider Signature:  ____________________________________________________________ Date:  ____________________________________________________________

## 2018-10-09 NOTE — PROCEDURES
Dennise England [facility] 912 Ben Childers M.D.  [place], [address]  [phone]               Esophagogastroduodenoscopy (EGD) Procedure Note    NAME: Austin Cochran  :  1950  MRN:  [id number]    Indications:  Abdominal pain, epigastric; GERD     : Uli Crews MD    Referring Provider:  Leonora Adamson MD    Medicine:  MAC anesthesia      Procedure Details:  After informed consent was obtained with all risks and benefits of the procedure explained and preprocedure exam completed, the patient was placed in the left lateral decubitus position. Universal protocol for patient identification was performed and documented in the nursing notes. Throughout the procedure, the patient's blood pressure was monitored at least every five minutes; pulse, and oxygen saturations were monitored continuously. All vital signs were documented in the nursing notes. The endoscope was inserted into the mouth and advanced under direct vision to second portion of the duodenum. A careful inspection was made as the gastroscope was withdrawn, including a retroflexed view of the proximal stomach; findings and interventions are described below. Findings:   Esophagus:normal  Stomach: multiple diminutive sessile polyps in the body with greatest diameter of 4 mm s/p biopsies, CLOtest biopsies also taken  Duodenum: normal s/p biopsies for celiac disease    Interventions:    biopsy of stomach and duodenum    Specimens:     ID Type Source Tests Collected by Time Destination   1 : Duodenum Rule Out Celiac Disease Preservative   Uli Crews MD 10/9/2018 6738 Pathology   2 : Gastric Polyps Preservative   Uli Crews MD 10/9/2018 0802 Pathology   3 : Random Colon Biopsies Rule Out Microscopic Colitis Preservative   Uli Crews MD 10/9/2018 8696 Pathology        EBL: None          Complications:     No immediate complications        Impression:  -As above. Recommendations:  -Await pathology. Signed by:  Yoko Campos MD         10/9/2018 8:27 AM

## 2018-10-09 NOTE — ANESTHESIA POSTPROCEDURE EVALUATION
Post-Anesthesia Evaluation and Assessment    Patient: Yareli Byers MRN: [id number]  SSN: [id number]    YOB: 1950  Age: 75 y.o. Sex: female       Cardiovascular Function/Vital Signs  Visit Vitals    /64    Pulse 67    Temp 36.4 °C (97.5 °F)    Resp 17    SpO2 100%    Breastfeeding No       Patient is status post MAC anesthesia for Procedure(s):  COLONOSCOPY  ESOPHAGOGASTRODUODENOSCOPY (EGD)  ESOPHAGOGASTRODUODENAL (EGD) BIOPSY  COLON BIOPSY. Nausea/Vomiting: None    Postoperative hydration reviewed and adequate. Pain:  Pain Scale 1: Numeric (0 - 10) (10/09/18 0846)  Pain Intensity 1: 0 (10/09/18 0846)   Managed    Neurological Status: At baseline    Mental Status and Level of Consciousness: Arousable    Pulmonary Status:   O2 Device: Room air (10/09/18 0846)   Adequate oxygenation and airway patent    Complications related to anesthesia: None    Post-anesthesia assessment completed.  No concerns    Signed By: Amie Faria MD     October 9, 2018

## 2018-10-09 NOTE — DISCHARGE INSTRUCTIONS
Dennise [place] [id number] Ricco Zaldivar M.D.  [address] [place] [address], [address] [place] [address]  [phone]          COLONOSCOPY DISCHARGE INSTRUCTIONS    Jimi Loaiza  [id number]  1950    DISCOMFORT: Warm salt water gargle for sore throat  Redness at IV site- apply warm compress to area; if redness or soreness persist- contact your physician  There may be a slight amount of blood passed from the rectum/oral  Gaseous discomfort- walking, belching will help relieve any discomfort  You may not operate a vehicle for 12 hours  You may not engage in an occupation involving machinery or appliances for the  rest of today  You may not drink alcoholic beverages for at least 12 hours  Avoid making any critical decisions for at least 24 hours    DIET:   You may resume your normal diet, but some patients find that heavy or large  meals may lead to indigestion or vomiting. I suggest a light meal as first food  intake. I recommend a whole food, plant-based diet for your overall health. ACTIVITY:  You may resume your normal daily activities. It is recommended that you spend the remainder of the day resting - avoid any strenuous activity. CALL M.D. IF ANY SIGN OF:   Increasing pain, nausea, vomiting  Abdominal distension (swelling)  Significant bleeding (oral or rectal)  Fever /chills  Pain in chest area  Shortness of breath    Additional Instructions:   Call Dr. Ricco Zaldivar if any questions or problems at [phone]   You should receive the biopsy results by phone or mail within 3 weeks, if not, call  my office for the results      Should have a repeat colonoscopy in 10 years. Colonoscopy showed mild diverticulosis. Random biopsies taken of the colon. EGD showed small stomach polyps-not the cause of pain. Biopsies taken. Setup follow-up office visit with Dr. Gayle Hill.

## 2019-01-09 ENCOUNTER — HOSPITAL ENCOUNTER (OUTPATIENT)
Dept: MAMMOGRAPHY | Age: 69
Discharge: HOME | End: 2019-01-09
Attending: FAMILY MEDICINE
Payer: MEDICARE

## 2019-01-09 DIAGNOSIS — Z12.39 SCREENING BREAST EXAMINATION: ICD-10-CM

## 2019-01-09 PROCEDURE — 77067 SCR MAMMO BI INCL CAD: CPT

## 2019-08-04 ENCOUNTER — APPOINTMENT (OUTPATIENT)
Dept: GENERAL RADIOLOGY | Age: 69
End: 2019-08-04
Attending: PHYSICIAN ASSISTANT
Payer: MEDICARE

## 2019-08-04 ENCOUNTER — APPOINTMENT (OUTPATIENT)
Dept: GENERAL RADIOLOGY | Age: 69
End: 2019-08-04
Attending: NURSE PRACTITIONER
Payer: MEDICARE

## 2019-08-04 ENCOUNTER — HOSPITAL ENCOUNTER (EMERGENCY)
Age: 69
Discharge: HOME | End: 2019-08-04
Attending: EMERGENCY MEDICINE
Payer: MEDICARE

## 2019-08-04 VITALS
WEIGHT: 113 LBS | RESPIRATION RATE: 16 BRPM | HEART RATE: 77 BPM | SYSTOLIC BLOOD PRESSURE: 147 MMHG | DIASTOLIC BLOOD PRESSURE: 65 MMHG | HEIGHT: 63 IN | TEMPERATURE: 98.2 F | OXYGEN SATURATION: 100 % | BODY MASS INDEX: 20.02 KG/M2

## 2019-08-04 DIAGNOSIS — S89.92XA INJURY OF LEFT KNEE, INITIAL ENCOUNTER: ICD-10-CM

## 2019-08-04 DIAGNOSIS — S99.911A INJURY OF RIGHT ANKLE, INITIAL ENCOUNTER: ICD-10-CM
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
## 2019-08-04 DIAGNOSIS — M25.462 KNEE EFFUSION, LEFT: ICD-10-CM

## 2019-08-04 PROCEDURE — L1830 KO IMMOB CANVAS LONG PRE OTS: HCPCS

## 2019-08-04 PROCEDURE — 99283 EMERGENCY DEPT VISIT LOW MDM: CPT

## 2019-08-04 PROCEDURE — 73610 X-RAY EXAM OF ANKLE: CPT

## 2019-08-04 PROCEDURE — 96372 THER/PROPH/DIAG INJ SC/IM: CPT

## 2019-08-04 PROCEDURE — 74011250636 HC RX REV CODE- 250/636: Performed by: NURSE PRACTITIONER

## 2019-08-04 PROCEDURE — 73562 X-RAY EXAM OF KNEE 3: CPT

## 2019-08-04 RX ORDER — KETOROLAC TROMETHAMINE 5 MG/ML
1 SOLUTION OPHTHALMIC 4 TIMES DAILY
Qty: 1 BOTTLE | Refills: 0 | Status: SHIPPED | OUTPATIENT
Start: 2019-08-04 | End: 2019-08-05

## 2019-08-04 RX ORDER — KETOROLAC TROMETHAMINE 30 MG/ML
30 INJECTION, SOLUTION INTRAMUSCULAR; INTRAVENOUS
Status: COMPLETED | OUTPATIENT
Start: 2019-08-04 | End: 2019-08-04

## 2019-08-04 RX ADMIN — KETOROLAC TROMETHAMINE 30 MG: 30 INJECTION, SOLUTION INTRAMUSCULAR at 20:42

## 2019-08-04 NOTE — LETTER
NOTIFICATION RETURN TO WORK / SCHOOL 
 
8/4/2019 8:38 PM 
 
[facility]igle [address] [facility] [place] [address] To Whom It May Concern: 
 
Angel Daigle is currently under the care of [facility] EMERGENCY DEPT. She will return to work/school on: 08/07/2019 If there are questions or concerns please have the patient contact our office.  
 
 
 
Sincerely, 
 
 
Katlyn DANIEL

## 2019-08-05 RX ORDER — DICLOFENAC POTASSIUM 50 MG/1
50 TABLET, FILM COATED ORAL 3 TIMES DAILY
Qty: 30 TAB | Refills: 0 | Status: SHIPPED | OUTPATIENT
Start: 2019-08-05

## 2019-08-05 NOTE — DISCHARGE INSTRUCTIONS
Patient Education        Preventing Falls: Care Instructions  Your Care Instructions    Getting around your home safely can be a challenge if you have injuries or health problems that make it easy for you to fall. Loose rugs and furniture in walkways are among the dangers for many older people who have problems walking or who have poor eyesight. People who have conditions such as arthritis, osteoporosis, or dementia also have to be careful not to fall. You can make your home safer with a few simple measures. Follow-up care is a key part of your treatment and safety. Be sure to make and go to all appointments, and call your doctor if you are having problems. It's also a good idea to know your test results and keep a list of the medicines you take. How can you care for yourself at home? Taking care of yourself  · You may get dizzy if you do not drink enough water. To prevent dehydration, drink plenty of fluids, enough so that your urine is light yellow or clear like water. Choose water and other caffeine-free clear liquids. If you have kidney, heart, or liver disease and have to limit fluids, talk with your doctor before you increase the amount of fluids you drink. · Exercise regularly to improve your strength, muscle tone, and balance. Walk if you can. Swimming may be a good choice if you cannot walk easily. · Have your vision and hearing checked each year or any time you notice a change. If you have trouble seeing and hearing, you might not be able to avoid objects and could lose your balance. · Know the side effects of the medicines you take. Ask your doctor or pharmacist whether the medicines you take can affect your balance. Sleeping pills or sedatives can affect your balance. · Limit the amount of alcohol you drink. Alcohol can impair your balance and other senses. · Ask your doctor whether calluses or corns on your feet need to be removed.  If you wear loose-fitting shoes because of calluses or corns, you can lose your balance and fall. · Talk to your doctor if you have numbness in your feet. Preventing falls at home  · Remove raised doorway thresholds, throw rugs, and clutter. Repair loose carpet or raised areas in the floor. · Move furniture and electrical cords to keep them out of walking paths. · Use nonskid floor wax, and wipe up spills right away, especially on ceramic tile floors. · If you use a walker or cane, put rubber tips on it. If you use crutches, clean the bottoms of them regularly with an abrasive pad, such as steel wool. · Keep your house well lit, especially [place], and outside walkways. Use night-lights in areas such as hallways and bathrooms. Add extra light switches or use remote switches (such as switches that go on or off when you clap your hands) to make it easier to turn lights on if you have to get up during the night. · Install sturdy handrails on stairways. · Move items in your cabinets so that the things you use a lot are on the lower shelves (about waist level). · Keep a cordless phone and a flashlight with new batteries by your bed. If possible, put a phone in each of the main rooms of your house, or carry a cell phone in case you fall and cannot reach a phone. Or, you can wear a device around your neck or wrist. You push a button that sends a signal for help. · Wear low-heeled shoes that fit well and give your feet good support. Use footwear with nonskid soles. Check the heels and soles of your shoes for wear. Repair or replace worn heels or soles. · Do not wear socks without shoes on wood floors. · Walk on the grass when the sidewalks are slippery. If you live in an area that gets snow and ice in the winter, sprinkle salt on slippery steps and sidewalks. Preventing falls in the bath  · Install grab bars and nonskid mats inside and outside your shower or tub and near the toilet and sinks. · Use shower chairs and bath benches.   · Use a hand-held shower head that will allow you to sit while showering. · Get into a tub or shower by putting the weaker leg in first. Get out of a tub or shower with your strong side first.  · Repair loose toilet seats and consider installing a raised toilet seat to make getting on and off the toilet easier. · Keep your bathroom door unlocked while you are in the shower. Where can you learn more? Go to http://troy-anastasia.info/. Enter [id number] [id number] in the search box to learn more about \"Preventing Falls: Care Instructions. \"  Current as of: November 7, 2018  Content Version: 12.1  © 7429-6525 [employer]. Care instructions adapted under license by [employer] (which disclaims liability or warranty for this information). If you have questions about a medical condition or this instruction, always ask your healthcare professional. [place] [address] any warranty or liability for your use of this information. Patient Education        Preventing Falls: Care Instructions  Your Care Instructions    Getting around your home safely can be a challenge if you have injuries or health problems that make it easy for you to fall. Loose rugs and furniture in walkways are among the dangers for many older people who have problems walking or who have poor eyesight. People who have conditions such as arthritis, osteoporosis, or dementia also have to be careful not to fall. You can make your home safer with a few simple measures. Follow-up care is a key part of your treatment and safety. Be sure to make and go to all appointments, and call your doctor if you are having problems. It's also a good idea to know your test results and keep a list of the medicines you take. How can you care for yourself at home? Taking care of yourself  · You may get dizzy if you do not drink enough water. To prevent dehydration, drink plenty of fluids, enough so that your urine is light yellow or clear like water. Choose water and other caffeine-free clear liquids. If you have kidney, heart, or liver disease and have to limit fluids, talk with your doctor before you increase the amount of fluids you drink. · Exercise regularly to improve your strength, muscle tone, and balance. Walk if you can. Swimming may be a good choice if you cannot walk easily. · Have your vision and hearing checked each year or any time you notice a change. If you have trouble seeing and hearing, you might not be able to avoid objects and could lose your balance. · Know the side effects of the medicines you take. Ask your doctor or pharmacist whether the medicines you take can affect your balance. Sleeping pills or sedatives can affect your balance. · Limit the amount of alcohol you drink. Alcohol can impair your balance and other senses. · Ask your doctor whether calluses or corns on your feet need to be removed. If you wear loose-fitting shoes because of calluses or corns, you can lose your balance and fall. · Talk to your doctor if you have numbness in your feet. Preventing falls at home  · Remove raised doorway thresholds, throw rugs, and clutter. Repair loose carpet or raised areas in the floor. · Move furniture and electrical cords to keep them out of walking paths. · Use nonskid floor wax, and wipe up spills right away, especially on ceramic tile floors. · If you use a walker or cane, put rubber tips on it. If you use crutches, clean the bottoms of them regularly with an abrasive pad, such as steel wool. · Keep your house well lit, especially [place], and outside walkways. Use night-lights in areas such as hallways and bathrooms. Add extra light switches or use remote switches (such as switches that go on or off when you clap your hands) to make it easier to turn lights on if you have to get up during the night. · Install sturdy handrails on stairways.   · Move items in your cabinets so that the things you use a lot are on the lower shelves (about waist level). · Keep a cordless phone and a flashlight with new batteries by your bed. If possible, put a phone in each of the main rooms of your house, or carry a cell phone in case you fall and cannot reach a phone. Or, you can wear a device around your neck or wrist. You push a button that sends a signal for help. · Wear low-heeled shoes that fit well and give your feet good support. Use footwear with nonskid soles. Check the heels and soles of your shoes for wear. Repair or replace worn heels or soles. · Do not wear socks without shoes on wood floors. · Walk on the grass when the sidewalks are slippery. If you live in an area that gets snow and ice in the winter, sprinkle salt on slippery steps and sidewalks. Preventing falls in the bath  · Install grab bars and nonskid mats inside and outside your shower or tub and near the toilet and sinks. · Use shower chairs and bath benches. · Use a hand-held shower head that will allow you to sit while showering. · Get into a tub or shower by putting the weaker leg in first. Get out of a tub or shower with your strong side first.  · Repair loose toilet seats and consider installing a raised toilet seat to make getting on and off the toilet easier. · Keep your bathroom door unlocked while you are in the shower. Where can you learn more? Go to http://troy-anastasia.info/. Enter [id number] [id number] in the search box to learn more about \"Preventing Falls: Care Instructions. \"  Current as of: March 16, 2018  Content Version: 11.8  © 5257-7000 [employer]. Care instructions adapted under license by [employer] (which disclaims liability or warranty for this information). If you have questions about a medical condition or this instruction, always ask your healthcare professional. [address]  any warranty or liability for your use of this information.        Patient Education        Learning About How to Use Crutches  Your Care Instructions  Crutches can help you walk when you have an injured hip, leg, knee, ankle, or foot. Your doctor will tell you how much weight--if any--you can put on your leg. Be sure your crutches fit you. When you stand up in your normal posture, there should be space for two or three fingers between the top of the crutch and your armpit. When you let your hands hang down, the hand  should be at your wrists. When you put your hands on the hand , your elbows should be slightly bent. To stay safe when using crutches:  · Look straight ahead, not down at your feet. · Clear away small rugs, cords, or anything else that could cause you to trip, slip, or fall. · Be very careful around pets and small children. They can get in your path when you least expect it. · Be sure the rubber tips on your crutches are clean and in good condition to help prevent slipping. · Avoid slick conditions, such as wet floors and snowy or icy driveways. In bad weather, be especially careful on curbs and steps. How to use crutches  Getting ready to walk    1. Bend your elbows slightly. Press the padded top parts of the crutches against your sides, under your armpits. 2. If you have been told not to put any weight on your injured leg, keep that leg bent and off the ground. How to walk with crutches when you can put weight on the injured leg    1. Put both crutches about 12 inches in front of you. The crutches and your feet should form a triangle. Hold the crutches close enough to your body so you can push straight down on them, but leave room between the crutches for your body to pass through. Don't lean forward to reach farther. 2. Put your weight on the handgrips, not on the pads under your arms. Constant pressure against your underarms can cause numbness. 3. Move your weak or injured leg forward so it's almost even with the crutches.   4. Bring your good leg up, so it's even with your weak or injured leg. 5. Move your crutches about 12 inches in front of you, and start the next step. Sitting down    1. To sit, back up to the chair. Use one hand to hold both crutches by the handgrips, beside your injured leg. With the other hand, hold onto the seat and slowly lower yourself onto the chair. 2. Lay the crutches on the ground near your chair. If you prop them up, they may fall over. Getting up from a chair    1. To get up from a chair,  the crutches and put them in one hand beside your injured leg. 2. Put your weight on the handgrips of the crutches and on your strong leg to stand up. How to go up and down stairs using crutches    1. Stand near the edge of the stairs. 2. Go up or down the stairs. ? If you are going up, step up with your stronger leg. Then bring the crutches and your weak or injured leg to the upper step. ? If you are going down, put your crutches and your weak or injured leg on the lower step. Then bring your stronger leg down to the lower step. Remember \"up with the good, and down with the bad\" to help you lead with the correct leg. Crutches: How to go up and down stairs with handrails    1. Stand near the edge of the stairs. 2. Put both crutches under the arm opposite the handrail. 3. Use the hand opposite the handrail to hold both crutches by the handgrips. 4. Hold onto the handrail as you go up or down. 5. Go up or down the stairs. ? If you are going up, step up with your stronger leg. Then bring the crutches and your weak or injured leg to the upper step. ? If you are going down, put your crutches and your weak or injured leg on the lower step. Then bring your stronger leg down to the lower step. Remember \"up with the good, down with the bad\" to help you lead with the correct leg. Follow-up care is a key part of your treatment and safety. Be sure to make and go to all appointments, and call your doctor if you are having problems.  It's also a good idea to know your test results and keep a list of the medicines you take. Where can you learn more? Go to http://troy-anastasia.info/. Enter E498 in the search box to learn more about \"Learning About How to Use Crutches. \"  Current as of: September 20, 2018  Content Version: 12.1  © 7941-8587 [employer]. Care instructions adapted under license by [employer] (which disclaims liability or warranty for this information). If you have questions about a medical condition or this instruction, always ask your healthcare professional. [address] any warranty or liability for your use of this information.

## 2019-08-05 NOTE — ED NOTES
Patient discharge by Benji Doyle pt sent to the front lobby, with strong and steady gait -  Discharge information / home RX / and reasons to return to the ED were reviewed by the doctor.

## 2019-08-05 NOTE — ED NOTES
Applied ice to the affected site     Applied knee immobilizer to left knee    Applied ace wrap applied

## 2019-08-05 NOTE — ED PROVIDER NOTES
EMERGENCY DEPARTMENT HISTORY AND PHYSICAL EXAM      Date: 8/4/2019  Patient Name: Traci Mejia    History of Presenting Illness     Chief Complaint   Patient presents with    Fall     fell when startled by a bat flying out of her shed earlier today; left knee pain and reports hearing a pop    Knee Pain       History Provided By: Patient    HPI: Traci Mejia, 70 y.o. female presents by POV to the ED with cc of right ankle pain and left knee pain and swelling. Patient states she went to her garden shed earlier this afternoon approximately 330 when a group of bats came out of the shed. Patient states she tried to run however she fell backwards and her body went one way and she felt a pop in the left knee. Patient states she tried to take some Tylenol and rest her leg however the pain has not subsided and the swelling has worsened. Patient states she feels a sharp pain in her knee. She states the pain decreases by sitting flat and extending the knee completely out however, pain increases significantly while walking and flexing the knee. Patient denies being able to put pressure on the knee. There are no other complaints, changes, or physical findings at this time. PCP: Jamie Mata MD    No current facility-administered medications on file prior to encounter. Current Outpatient Medications on File Prior to Encounter   Medication Sig Dispense Refill    MULTIVITAMIN PO Take  by mouth. Takes one po once daily.  calcium citrate/vitamin D3 (CALCIUM CITRATE + D PO) Take 250 mg by mouth daily.  cholecalciferol, vitamin d3, (VITAMIN D3) 400 unit cap Take 400 Units by mouth daily.  vit C/E/Zn/coppr/lutein/zeaxan (PRESERVISION AREDS-2 PO) Take 1 Cap by mouth daily.  light mineral oil-mineral oil (SOOTHE XP) 1-4.5 % drop ophthalmic solution Administer 1 Drop to both eyes as needed.  OMEGA-3S/DHA/EPA/FISH OIL (OMEGA 3 PO) Take 1,280 mg by mouth daily.          Past History Past Medical History:  Past Medical History:   Diagnosis Date    Arrhythmia     bradycardia    Hyperlipidemia     Ill-defined condition     dyslipidemia    Osteopenia     Tendonitis of foot     acute L foot       Past Surgical History:  Past Surgical History:   Procedure Laterality Date    COLONOSCOPY N/A 10/9/2018    COLONOSCOPY performed by [facility] Pena [facility] at [facility] ENDOSCOPY    HX BREAST BIOPSY Left     29 yrs ago    HX CATARACT REMOVAL Bilateral     HX GYN      fibroid from uterus in 1987    HX HEENT      wisdom teeth removed age 27    HX ORTHOPAEDIC      rotator cuff right shoulder     HX OTHER SURGICAL  01/2014    heart loop monitor    LA COLONOSCOPY FLX DX W/COLLJ SPEC WHEN PFRMD  2011    diverticulitis f/up 10 yrs        Family History:  Family History   Problem Relation Age of Onset    Dementia Mother     Hypertension Mother     Headache Mother     Elevated Lipids Mother     Stroke Mother     Coronary Artery Disease Father     Cancer Father         prostate ca     Heart Disease Father         CABG    Hypertension Father     Coronary Artery Disease Brother     Hypertension Brother     Elevated Lipids Brother     Coronary Artery Disease Sister     Hypertension Sister     Elevated Lipids Sister     Cancer Sister         thyroid ca and breast ca     Breast Cancer Sister 52    Stroke Sister     Heart Attack Maternal Aunt     Heart Attack Maternal Uncle     Cancer Maternal Uncle         breast     Broken Bones Paternal Aunt     Broken Bones Paternal Uncle     Hypertension Sister     Elevated Lipids Sister        Social History:  Social History     Tobacco Use    Smoking status: Never Smoker    Smokeless tobacco: Never Used   Substance Use Topics    Alcohol use: No    Drug use: No       Allergies: Allergies   Allergen Reactions    Codeine Other (comments)     DROP IN BP    Opioids - Morphine Analogues Other (comments)     BP drops.     Tape [Adhesive] Other (comments)     rash         Review of Systems   Review of Systems   Constitutional: Negative for chills and fever. HENT: Negative for congestion, rhinorrhea and sore throat. Respiratory: Negative for cough and shortness of breath. Cardiovascular: Negative for chest pain. Gastrointestinal: Negative for abdominal pain, nausea and vomiting. Endocrine: Negative for polyuria. Genitourinary: Negative for dysuria and frequency. Musculoskeletal: Positive for arthralgias, gait problem, joint swelling and myalgias. Skin: Negative for rash. Neurological: Negative for dizziness, weakness and headaches. All other systems reviewed and are negative. Physical Exam   Physical Exam   Constitutional: She is oriented to person, place, and time. She appears well-developed and well-nourished. No distress. 70 y.o. female   HENT:   Head: Normocephalic and atraumatic. Eyes: Pupils are equal, round, and reactive to light. Conjunctivae are normal.   Neck: Normal range of motion. Cardiovascular: Normal rate, regular rhythm and normal heart sounds. No murmur heard. Pulmonary/Chest: Effort normal and breath sounds normal. No respiratory distress. She has no wheezes. Musculoskeletal:        Left knee: She exhibits decreased range of motion, swelling, effusion and bony tenderness. She exhibits no laceration. Tenderness found. Medial joint line and lateral joint line tenderness noted. Right ankle: She exhibits swelling. She exhibits no deformity and no laceration. Achilles tendon exhibits no pain and no defect. Neurological: She is alert and oriented to person, place, and time. Skin: Skin is warm and dry. She is not diaphoretic. Psychiatric: She has a normal mood and affect. Her behavior is normal.   Nursing note and vitals reviewed. Diagnostic Study Results     Labs -   No results found for this or any previous visit (from the past 12 hour(s)).     Radiologic Studies -   XR ANKLE RT MIN 3 V Final Result   IMPRESSION:    1. Minimal soft tissue swelling and a small joint effusion. XR KNEE LT 3 V   Final Result   IMPRESSION:    1. No visible fracture. 2. There is soft tissue swelling and a joint effusion. If there is concern for   internal derangement, recommend nonemergent MRI. Medical Decision Making   I am the first provider for this patient. I reviewed the vital signs, available nursing notes, past medical history, past surgical history, family history and social history. Vital Signs-Reviewed the patient's vital signs. Patient Vitals for the past 12 hrs:   Temp Pulse Resp BP SpO2   08/04/19 1835 98.2 °F (36.8 °C) 77 16 147/65 100 %       Records Reviewed: Nursing Notes and Old Medical Records    Provider Notes (Medical Decision Making):   Differential diagnoses include right ankle fracture, dislocation, left knee fracture, left knee dislocation, left knee effusion, left knee soft tissue swelling. ED Course:   Initial assessment performed. The patients presenting problems have been discussed, and they are in agreement with the care plan formulated and outlined with them. I have encouraged them to ask questions as they arise throughout their visit. Discussed negative bony abnormalities with the patient however, advised patient that she should follow-up with orthopedic clinic as soon as possible by calling and setting up an appointment in the morning. Also advised patient that she should wear the knee immobilizer and crutches as much as possible while at home to elevate and rest the leg. Also advised patient to continue to take the anti-inflammatory medications and keep knee and extremities elevated as much as possible. Patient acknowledged and agree. Critical Care Time: None    Disposition:  DISCHARGE NOTE:  8:33 PM  The pt is ready for discharge.  The pt's signs, symptoms, diagnosis, and discharge instructions have been discussed and pt has conveyed their understanding. The pt is to follow up as recommended or return to ER should their symptoms worsen. Plan has been discussed and pt is in agreement. Norah Alonso NP  8/4/2019      PLAN:  1. Current Discharge Medication List      START taking these medications    Details   ketorolac (ACULAR) 0.5 % ophthalmic solution Apply 1 Drop to eye four (4) times daily for 10 days. Qty: 1 Bottle, Refills: 0         CONTINUE these medications which have NOT CHANGED    Details   MULTIVITAMIN PO Take  by mouth. Takes one po once daily. calcium citrate/vitamin D3 (CALCIUM CITRATE + D PO) Take 250 mg by mouth daily. cholecalciferol, vitamin d3, (VITAMIN D3) 400 unit cap Take 400 Units by mouth daily. vit C/E/Zn/coppr/lutein/zeaxan (PRESERVISION AREDS-2 PO) Take 1 Cap by mouth daily. light mineral oil-mineral oil (SOOTHE XP) 1-4.5 % drop ophthalmic solution Administer 1 Drop to both eyes as needed. OMEGA-3S/DHA/EPA/FISH OIL (OMEGA 3 PO) Take 1,280 mg by mouth daily. 2.   Follow-up Information     Follow up With Specialties Details Why Contact Info    [facility] EMERGENCY DEPT Emergency Medicine Go to As needed, If symptoms worsen [address] [place], [place] in 3 days As needed, If symptoms worsen [place] [phone]      Ashley Bae PA-C Physician Assistant Schedule an appointment as soon as possible for a visit in 1 day As needed, If symptoms worsen [address] [facility] [address]  [phone]          Return to ED if worse     Diagnosis     Clinical Impression:   1. Fall, initial encounter    2. Injury of left knee, initial encounter    3. Injury of right ankle, initial encounter          Please note that this dictation was completed with Navent, the [employer] voice recognition software.  Quite often unanticipated grammatical, syntax, homophones, and other interpretive errors are inadvertently transcribed by the computer software. Please disregards these errors. Please excuse any errors that have escaped final proofreading. This note will not be viewable in [address]

## 2019-08-23 ENCOUNTER — HOSPITAL ENCOUNTER (OUTPATIENT)
Dept: MRI IMAGING | Age: 69
Discharge: HOME | End: 2019-08-23
Attending: FAMILY MEDICINE
Payer: MEDICARE

## 2019-08-23 DIAGNOSIS — S83.207A ACUTE MENISCAL TEAR OF LEFT KNEE, INITIAL ENCOUNTER: ICD-10-CM

## 2019-08-23 PROCEDURE — 73721 MRI JNT OF LWR EXTRE W/O DYE: CPT

## 2019-12-30 ENCOUNTER — HOSPITAL ENCOUNTER (OUTPATIENT)
Dept: BONE DENSITY | Age: 69
Discharge: HOME | End: 2019-12-30
Attending: NURSE PRACTITIONER
Payer: MEDICARE

## 2019-12-30 DIAGNOSIS — M81.0 OSTEOPOROSIS: ICD-10-CM

## 2019-12-30 PROCEDURE — 77080 DXA BONE DENSITY AXIAL: CPT

## 2021-02-22 ENCOUNTER — OFFICE VISIT (OUTPATIENT)
Dept: CARDIOLOGY CLINIC | Age: 71
End: 2021-02-22
Payer: MEDICARE

## 2021-02-22 ENCOUNTER — HOSPITAL ENCOUNTER (OUTPATIENT)
Dept: LAB | Age: 71
Discharge: HOME | End: 2021-02-22
Payer: MEDICARE

## 2021-02-22 VITALS
SYSTOLIC BLOOD PRESSURE: 150 MMHG | HEART RATE: 60 BPM | OXYGEN SATURATION: 99 % | BODY MASS INDEX: 22.39 KG/M2 | WEIGHT: 126.4 LBS | DIASTOLIC BLOOD PRESSURE: 86 MMHG | HEIGHT: 63 IN | RESPIRATION RATE: 18 BRPM

## 2021-02-22 DIAGNOSIS — R00.2 PALPITATIONS: Primary | ICD-10-CM

## 2021-02-22 DIAGNOSIS — R53.83 OTHER FATIGUE: ICD-10-CM

## 2021-02-22 PROCEDURE — 80053 COMPREHEN METABOLIC PANEL: CPT

## 2021-02-22 PROCEDURE — 99214 OFFICE O/P EST MOD 30 MIN: CPT | Performed by: INTERNAL MEDICINE

## 2021-02-22 PROCEDURE — 93010 ELECTROCARDIOGRAM REPORT: CPT | Performed by: INTERNAL MEDICINE

## 2021-02-22 PROCEDURE — 93005 ELECTROCARDIOGRAM TRACING: CPT | Performed by: INTERNAL MEDICINE

## 2021-02-22 PROCEDURE — G8536 NO DOC ELDER MAL SCRN: HCPCS | Performed by: INTERNAL MEDICINE

## 2021-02-22 PROCEDURE — G8399 PT W/DXA RESULTS DOCUMENT: HCPCS | Performed by: INTERNAL MEDICINE

## 2021-02-22 PROCEDURE — 80061 LIPID PANEL: CPT

## 2021-02-22 PROCEDURE — G8427 DOCREV CUR MEDS BY ELIG CLIN: HCPCS | Performed by: INTERNAL MEDICINE

## 2021-02-22 PROCEDURE — G8420 CALC BMI NORM PARAMETERS: HCPCS | Performed by: INTERNAL MEDICINE

## 2021-02-22 PROCEDURE — G0463 HOSPITAL OUTPT CLINIC VISIT: HCPCS | Performed by: INTERNAL MEDICINE

## 2021-02-22 PROCEDURE — 36415 COLL VENOUS BLD VENIPUNCTURE: CPT

## 2021-02-22 PROCEDURE — G8510 SCR DEP NEG, NO PLAN REQD: HCPCS | Performed by: INTERNAL MEDICINE

## 2021-02-22 PROCEDURE — 85027 COMPLETE CBC AUTOMATED: CPT

## 2021-02-22 PROCEDURE — 1101F PT FALLS ASSESS-DOCD LE1/YR: CPT | Performed by: INTERNAL MEDICINE

## 2021-02-22 PROCEDURE — 3017F COLORECTAL CA SCREEN DOC REV: CPT | Performed by: INTERNAL MEDICINE

## 2021-02-22 PROCEDURE — 84443 ASSAY THYROID STIM HORMONE: CPT

## 2021-02-22 PROCEDURE — 1090F PRES/ABSN URINE INCON ASSESS: CPT | Performed by: INTERNAL MEDICINE

## 2021-02-22 NOTE — LETTER
2/22/2021 Patient: Javon Montoya YOB: 1950 Date of Visit: 2/22/2021 Maude Gamino [address] [place] [id number] [address] [employer] Dear Jennifer Charlton MD, Thank you for referring Ms. Zoe Santos to [facility] for evaluation. My notes for this consultation are attached. If you have questions, please do not hesitate to call me. I look forward to following your patient along with you. Sincerely, Camelia Munroe MD

## 2021-02-22 NOTE — PROGRESS NOTES
Fernando Lopez, FNP-BC    Subjective/HPI:     Alvin Douglas is a 69 y.o. female is here for routine f/u. She has no significant PMHx. She wants a check up on her heart. Is concerned about palpitation symptoms that occur 1-2 x/week. Had an episode of intense HR a few months ago which lasted a few minutes. She also had a headache and felt nauseous. She laid down for a few hours. Has not recurred since. She reports some fatigue symptoms lately, however feels this is due to lack of exercise. She would like to check her cholesterol. Has not had this checked in over a year. Wants us to check it because she is between PCPs now. Current Outpatient Medications on File Prior to Visit   Medication Sig Dispense Refill    MULTIVITAMIN PO Take  by mouth. Takes one po once daily.  calcium citrate/vitamin D3 (CALCIUM CITRATE + D PO) Take 250 mg by mouth daily.  cholecalciferol, vitamin d3, (VITAMIN D3) 400 unit cap Take 400 Units by mouth daily.  vit C/E/Zn/coppr/lutein/zeaxan (PRESERVISION AREDS-2 PO) Take 1 Cap by mouth daily.  OMEGA-3S/DHA/EPA/FISH OIL (OMEGA 3 PO) Take 1,280 mg by mouth daily.  diclofenac potassium (CATAFLAM) 50 mg tablet Take 1 Tab by mouth three (3) times daily. 30 Tab 0    light mineral oil-mineral oil (SOOTHE XP) 1-4.5 % drop ophthalmic solution Administer 1 Drop to both eyes as needed. No current facility-administered medications on file prior to visit. Review of Symptoms:    Review of Systems   Constitutional: Positive for malaise/fatigue. Negative for chills, fever and weight loss. HENT: Negative for nosebleeds. Eyes: Negative for blurred vision and double vision. Respiratory: Negative for cough, shortness of breath and wheezing. Cardiovascular: Positive for palpitations. Negative for chest pain, orthopnea, leg swelling and PND.    Gastrointestinal: Negative for abdominal pain, blood in stool, diarrhea, nausea and vomiting. Musculoskeletal: Negative for joint pain. Skin: Negative for rash. Neurological: Negative for dizziness, tingling and loss of consciousness. Endo/Heme/Allergies: Does not bruise/bleed easily. Physical Exam:      General: Well developed, in no acute distress, cooperative and alert  Heart:  reg rate and rhythm; normal S1/S2; no murmurs, no gallops or rubs. Respiratory: Clear bilaterally x 4, no wheezing or rales  Extremities:  Normal cap refill, no cyanosis, atraumatic. No edema. Vascular: 2+ pulses symmetric in all extremities    Vitals:    02/22/21 1042 02/22/21 1043   BP: (!) 152/86 (!) 150/86   Pulse: 60    Resp: 18    SpO2: 99%    Weight: 126 lb 6.4 oz (57.3 kg)    Height: 5' 2.5\" (1.588 m)        ECG: sinus rhythm; non-specific ST depression     Assessment:       ICD-10-CM ICD-9-CM    1. Palpitations  R00.2 785.1 AMB POC EKG ROUTINE W/ 12 LEADS, INTER & REP      TSH REFLEX TO T4      LIPID PANEL      CBC W/O DIFF      METABOLIC PANEL, COMPREHENSIVE   2. Other fatigue  R53.83 780.79 TSH REFLEX TO T4      LIPID PANEL      CBC W/O DIFF      METABOLIC PANEL, COMPREHENSIVE        Plan:     1. Palpitations  Offered event monitor but she declines  Check TSH, CBC  Continue conservative management  Previously had ILR implanted in 2014, and no events recorded. This was explanted in 2015    2. Other fatigue  Discussed option of stress test but she declines  Check labs -- CBC, thyroid, CMP  Advised she will need to follow up with PCP    3. Abnormal EKG  Has previously had cardiac cath in 2014 with normal coronaries      Shwetha Maldonado NP        [facility]    2/22/2021         Patient seen, examined by me personally. Plan discussed as detailed. Agree with note as outlined by  NP. I confirm findings in history and physical exam. No additional findings noted. Agree with plan as outlined above. Her symptoms appear to be stable. Previous w/u including ILR, cath were negative. Encouraged exercise, low salt diet.     Demi Christian MD

## 2021-02-22 NOTE — PROGRESS NOTES
Chief Complaint   Patient presents with    Slow Heart Rate     Follow up     Palpitations     had an issue in November 2020- chest pain and rapid HR - only happened one day - still gets the palpitations a few times a week     Shortness of Breath     upon exertion      1. Have you been to the ER, urgent care clinic since your last visit? Hospitalized since your last visit? No     2. Have you seen or consulted any other health care providers outside of the [address] [place] [address] since your last visit? Include any pap smears or colon screening.   [facility]

## 2021-02-23 LAB
ALBUMIN SERPL-MCNC: 4.7 G/DL (ref 3.7–4.7)
ALBUMIN/GLOB SERPL: 2 {RATIO} (ref 1.2–2.2)
ALP SERPL-CCNC: 43 IU/L (ref 39–117)
ALT SERPL-CCNC: 16 IU/L (ref 0–32)
AST SERPL-CCNC: 21 IU/L (ref 0–40)
BILIRUB SERPL-MCNC: 0.9 MG/DL (ref 0–1.2)
BUN SERPL-MCNC: 9 MG/DL (ref 8–27)
BUN/CREAT SERPL: 15 (ref 12–28)
CALCIUM SERPL-MCNC: 9.9 MG/DL (ref 8.7–10.3)
CHLORIDE SERPL-SCNC: 102 MMOL/L (ref 96–106)
CHOLEST SERPL-MCNC: 187 MG/DL (ref 100–199)
CO2 SERPL-SCNC: 26 MMOL/L (ref 20–29)
CREAT SERPL-MCNC: 0.61 MG/DL (ref 0.57–1)
ERYTHROCYTE [DISTWIDTH] IN BLOOD BY AUTOMATED COUNT: 13 % (ref 11.7–15.4)
GLOBULIN SER CALC-MCNC: 2.3 G/DL (ref 1.5–4.5)
GLUCOSE SERPL-MCNC: 104 MG/DL (ref 65–99)
HCT VFR BLD AUTO: 43 % (ref 34–46.6)
HDLC SERPL-MCNC: 48 MG/DL
HGB BLD-MCNC: 14.2 G/DL (ref 11.1–15.9)
INTERPRETATION, 910389: NORMAL
LDLC SERPL CALC-MCNC: 119 MG/DL (ref 0–99)
MCH RBC QN AUTO: 27.9 PG (ref 26.6–33)
MCHC RBC AUTO-ENTMCNC: 33 G/DL (ref 31.5–35.7)
MCV RBC AUTO: 85 FL (ref 79–97)
PLATELET # BLD AUTO: 272 X10E3/UL (ref 150–450)
POTASSIUM SERPL-SCNC: 4.4 MMOL/L (ref 3.5–5.2)
PROT SERPL-MCNC: 7 G/DL (ref 6–8.5)
RBC # BLD AUTO: 5.09 X10E6/UL (ref 3.77–5.28)
SODIUM SERPL-SCNC: 143 MMOL/L (ref 134–144)
TRIGL SERPL-MCNC: 110 MG/DL (ref 0–149)
TSH SERPL DL<=0.005 MIU/L-ACNC: 0.99 UIU/ML (ref 0.45–4.5)
VLDLC SERPL CALC-MCNC: 20 MG/DL (ref 5–40)
WBC # BLD AUTO: 5.8 X10E3/UL (ref 3.4–10.8)

## 2021-02-23 NOTE — PROGRESS NOTES
Good morning Ms. Brittany Woods is all normal.  Your thyroid function is normal, electrolytes, kidney and liver function are also within normal limits. Your cholesterol numbers look good. I would continue to work on diet and exercise for now. If you feel your symptoms are worsening, then please follow-up with us to discuss testing.     Thanks,  Cristian Marvin, FNP-BC

## 2021-04-13 ENCOUNTER — TRANSCRIBE ORDER (OUTPATIENT)
Dept: SCHEDULING | Age: 71
End: 2021-04-13

## 2021-04-13 DIAGNOSIS — Z12.31 VISIT FOR SCREENING MAMMOGRAM: Primary | ICD-10-CM

## 2021-05-04 ENCOUNTER — HOSPITAL ENCOUNTER (OUTPATIENT)
Dept: MAMMOGRAPHY | Age: 71
Discharge: HOME | End: 2021-05-04
Attending: FAMILY MEDICINE
Payer: MEDICARE

## 2021-05-04 DIAGNOSIS — Z12.31 VISIT FOR SCREENING MAMMOGRAM: ICD-10-CM

## 2021-05-04 PROCEDURE — 77067 SCR MAMMO BI INCL CAD: CPT

## 2021-12-10 ENCOUNTER — TRANSCRIBE ORDER (OUTPATIENT)
Dept: SCHEDULING | Age: 71
End: 2021-12-10

## 2021-12-10 DIAGNOSIS — Z78.0 MENOPAUSE: Primary | ICD-10-CM

## 2021-12-30 ENCOUNTER — HOSPITAL ENCOUNTER (OUTPATIENT)
Dept: BONE DENSITY | Age: 71
Discharge: HOME | End: 2021-12-30
Attending: NURSE PRACTITIONER
Payer: MEDICARE

## 2021-12-30 DIAGNOSIS — Z78.0 MENOPAUSE: ICD-10-CM

## 2021-12-30 PROCEDURE — 77080 DXA BONE DENSITY AXIAL: CPT

## 2022-02-11 ENCOUNTER — TELEPHONE (OUTPATIENT)
Dept: CARDIOLOGY CLINIC | Age: 72
End: 2022-02-11

## 2022-02-11 NOTE — TELEPHONE ENCOUNTER
Left  for pt to call to schedule an appt with  on 2/14 at 915. If slot is taken then she will see Atasunitha Barlow on 5/23. Her 5/16 appt had to be cancelled due to  not being in the office that day.     Thanks  [employer]

## 2022-02-11 NOTE — TELEPHONE ENCOUNTER
2/14 has been taken. She can go on  schedule on 3/24 at 1 pm if available .     Thanks  [employer]

## 2022-05-24 ENCOUNTER — TRANSCRIBE ORDER (OUTPATIENT)
Dept: SCHEDULING | Age: 72
End: 2022-05-24

## 2022-05-24 DIAGNOSIS — R05.3 CHRONIC COUGH: Primary | ICD-10-CM

## 2022-06-01 ENCOUNTER — HOSPITAL ENCOUNTER (OUTPATIENT)
Dept: CT IMAGING | Age: 72
Discharge: HOME | End: 2022-06-01
Attending: NURSE PRACTITIONER
Payer: MEDICARE

## 2022-06-01 DIAGNOSIS — R05.3 CHRONIC COUGH: ICD-10-CM

## 2022-06-01 LAB — CREAT BLD-MCNC: 0.8 MG/DL (ref 0.6–1.3)

## 2022-06-01 PROCEDURE — 82565 ASSAY OF CREATININE: CPT

## 2022-06-01 PROCEDURE — 74011000636 HC RX REV CODE- 636

## 2022-06-01 PROCEDURE — 71260 CT THORAX DX C+: CPT

## 2022-06-01 RX ADMIN — IOPAMIDOL 100 ML: 755 INJECTION, SOLUTION INTRAVENOUS at 14:01

## 2022-06-29 ENCOUNTER — TRANSCRIBE ORDER (OUTPATIENT)
Dept: SCHEDULING | Age: 72
End: 2022-06-29

## 2022-06-29 DIAGNOSIS — Z12.31 VISIT FOR SCREENING MAMMOGRAM: Primary | ICD-10-CM

## 2022-07-22 ENCOUNTER — TRANSCRIBE ORDER (OUTPATIENT)
Dept: SCHEDULING | Age: 72
End: 2022-07-22

## 2022-07-22 DIAGNOSIS — R06.02 SHORTNESS OF BREATH: Primary | ICD-10-CM

## 2022-07-29 ENCOUNTER — HOSPITAL ENCOUNTER (OUTPATIENT)
Dept: MAMMOGRAPHY | Age: 72
Discharge: HOME | End: 2022-07-29
Attending: FAMILY MEDICINE
Payer: MEDICARE

## 2022-07-29 DIAGNOSIS — Z12.31 VISIT FOR SCREENING MAMMOGRAM: ICD-10-CM

## 2022-07-29 PROCEDURE — 77067 SCR MAMMO BI INCL CAD: CPT

## 2022-08-01 ENCOUNTER — HOSPITAL ENCOUNTER (OUTPATIENT)
Dept: CT IMAGING | Age: 72
Discharge: HOME | End: 2022-08-01
Attending: INTERNAL MEDICINE
Payer: MEDICARE

## 2022-08-01 DIAGNOSIS — R06.02 SHORTNESS OF BREATH: ICD-10-CM

## 2022-08-01 PROCEDURE — 71250 CT THORAX DX C-: CPT

## 2023-07-08 ENCOUNTER — APPOINTMENT (OUTPATIENT)
Facility: HOSPITAL | Age: 73
DRG: 305 | End: 2023-07-08
Payer: MEDICARE

## 2023-07-08 ENCOUNTER — HOSPITAL ENCOUNTER (INPATIENT)
Facility: HOSPITAL | Age: 73
LOS: 1 days | Discharge: HOME | DRG: 305 | End: 2023-07-09
Attending: EMERGENCY MEDICINE | Admitting: INTERNAL MEDICINE
Payer: MEDICARE

## 2023-07-08 DIAGNOSIS — R51.9 NONINTRACTABLE HEADACHE, UNSPECIFIED CHRONICITY PATTERN, UNSPECIFIED HEADACHE TYPE: ICD-10-CM

## 2023-07-08 DIAGNOSIS — R26.0 ATAXIC GAIT: Primary | ICD-10-CM

## 2023-07-08 PROBLEM — I63.9 ACUTE CVA (CEREBROVASCULAR ACCIDENT) (HCC): Status: ACTIVE | Noted: 2023-07-08

## 2023-07-08 LAB
ALBUMIN SERPL-MCNC: 4 G/DL (ref 3.5–5)
ALBUMIN/GLOB SERPL: 1.3 (ref 1.1–2.2)
ALP SERPL-CCNC: 34 U/L (ref 45–117)
ALT SERPL-CCNC: 56 U/L (ref 12–78)
ANION GAP SERPL CALC-SCNC: 3 MMOL/L (ref 5–15)
AST SERPL-CCNC: 37 U/L (ref 15–37)
BASOPHILS # BLD: 0.1 K/UL (ref 0–0.1)
BASOPHILS NFR BLD: 1 % (ref 0–1)
BILIRUB SERPL-MCNC: 1 MG/DL (ref 0.2–1)
BUN SERPL-MCNC: 11 MG/DL (ref 6–20)
BUN/CREAT SERPL: 18 (ref 12–20)
CALCIUM SERPL-MCNC: 8.8 MG/DL (ref 8.5–10.1)
CHLORIDE SERPL-SCNC: 105 MMOL/L (ref 97–108)
CO2 SERPL-SCNC: 28 MMOL/L (ref 21–32)
CREAT SERPL-MCNC: 0.6 MG/DL (ref 0.55–1.02)
DIFFERENTIAL METHOD BLD: ABNORMAL
EKG ATRIAL RATE: 68 BPM
EKG DIAGNOSIS: NORMAL
EKG P AXIS: 50 DEGREES
EKG P-R INTERVAL: 144 MS
EKG Q-T INTERVAL: 398 MS
EKG QRS DURATION: 86 MS
EKG QTC CALCULATION (BAZETT): 423 MS
EKG R AXIS: 68 DEGREES
EKG T AXIS: 71 DEGREES
EKG VENTRICULAR RATE: 68 BPM
EOSINOPHIL # BLD: 0.1 K/UL (ref 0–0.4)
EOSINOPHIL NFR BLD: 2 % (ref 0–7)
ERYTHROCYTE [DISTWIDTH] IN BLOOD BY AUTOMATED COUNT: 13.8 % (ref 11.5–14.5)
GLOBULIN SER CALC-MCNC: 3 G/DL (ref 2–4)
GLUCOSE BLD STRIP.AUTO-MCNC: 112 MG/DL (ref 65–117)
GLUCOSE SERPL-MCNC: 109 MG/DL (ref 65–100)
HCT VFR BLD AUTO: 40.8 % (ref 35–47)
HGB BLD-MCNC: 13.6 G/DL (ref 11.5–16)
IMM GRANULOCYTES # BLD AUTO: 0 K/UL (ref 0–0.04)
IMM GRANULOCYTES NFR BLD AUTO: 1 % (ref 0–0.5)
INR PPP: 1.1 (ref 0.9–1.1)
LYMPHOCYTES # BLD: 1 K/UL (ref 0.8–3.5)
LYMPHOCYTES NFR BLD: 24 % (ref 12–49)
MCH RBC QN AUTO: 29.3 PG (ref 26–34)
MCHC RBC AUTO-ENTMCNC: 33.3 G/DL (ref 30–36.5)
MCV RBC AUTO: 87.9 FL (ref 80–99)
MONOCYTES # BLD: 0.2 K/UL (ref 0–1)
MONOCYTES NFR BLD: 5 % (ref 5–13)
NEUTS SEG # BLD: 2.7 K/UL (ref 1.8–8)
NEUTS SEG NFR BLD: 67 % (ref 32–75)
NRBC # BLD: 0 K/UL (ref 0–0.01)
NRBC BLD-RTO: 0 PER 100 WBC
PLATELET # BLD AUTO: 223 K/UL (ref 150–400)
PMV BLD AUTO: 10.6 FL (ref 8.9–12.9)
POTASSIUM SERPL-SCNC: 3.9 MMOL/L (ref 3.5–5.1)
PROT SERPL-MCNC: 7 G/DL (ref 6.4–8.2)
PROTHROMBIN TIME: 11.6 SEC (ref 9–11.1)
RBC # BLD AUTO: 4.64 M/UL (ref 3.8–5.2)
SERVICE CMNT-IMP: NORMAL
SODIUM SERPL-SCNC: 136 MMOL/L (ref 136–145)
TROPONIN I SERPL HS-MCNC: 42 NG/L (ref 0–51)
WBC # BLD AUTO: 4 K/UL (ref 3.6–11)

## 2023-07-08 PROCEDURE — 1100000000 HC RM PRIVATE

## 2023-07-08 PROCEDURE — 36415 COLL VENOUS BLD VENIPUNCTURE: CPT

## 2023-07-08 PROCEDURE — 85610 PROTHROMBIN TIME: CPT

## 2023-07-08 PROCEDURE — 85025 COMPLETE CBC W/AUTO DIFF WBC: CPT

## 2023-07-08 PROCEDURE — 99285 EMERGENCY DEPT VISIT HI MDM: CPT

## 2023-07-08 PROCEDURE — 70498 CT ANGIOGRAPHY NECK: CPT

## 2023-07-08 PROCEDURE — 84484 ASSAY OF TROPONIN QUANT: CPT

## 2023-07-08 PROCEDURE — 6360000002 HC RX W HCPCS: Performed by: INTERNAL MEDICINE

## 2023-07-08 PROCEDURE — 4A03X5D MEASUREMENT OF ARTERIAL FLOW, INTRACRANIAL, EXTERNAL APPROACH: ICD-10-PCS | Performed by: INTERNAL MEDICINE

## 2023-07-08 PROCEDURE — 0042T CT BRAIN PERFUSION: CPT

## 2023-07-08 PROCEDURE — 70450 CT HEAD/BRAIN W/O DYE: CPT

## 2023-07-08 PROCEDURE — 80053 COMPREHEN METABOLIC PANEL: CPT

## 2023-07-08 PROCEDURE — 6370000000 HC RX 637 (ALT 250 FOR IP): Performed by: INTERNAL MEDICINE

## 2023-07-08 PROCEDURE — 6360000004 HC RX CONTRAST MEDICATION: Performed by: EMERGENCY MEDICINE

## 2023-07-08 PROCEDURE — 93005 ELECTROCARDIOGRAM TRACING: CPT | Performed by: EMERGENCY MEDICINE

## 2023-07-08 PROCEDURE — 82962 GLUCOSE BLOOD TEST: CPT

## 2023-07-08 PROCEDURE — 70551 MRI BRAIN STEM W/O DYE: CPT

## 2023-07-08 RX ORDER — ENOXAPARIN SODIUM 100 MG/ML
40 INJECTION SUBCUTANEOUS DAILY
Status: DISCONTINUED | OUTPATIENT
Start: 2023-07-08 | End: 2023-07-09 | Stop reason: HOSPADM

## 2023-07-08 RX ORDER — ASPIRIN 300 MG/1
300 SUPPOSITORY RECTAL DAILY
Status: DISCONTINUED | OUTPATIENT
Start: 2023-07-08 | End: 2023-07-09 | Stop reason: HOSPADM

## 2023-07-08 RX ORDER — POLYETHYLENE GLYCOL 3350 17 G/17G
17 POWDER, FOR SOLUTION ORAL DAILY PRN
Status: DISCONTINUED | OUTPATIENT
Start: 2023-07-08 | End: 2023-07-09 | Stop reason: HOSPADM

## 2023-07-08 RX ORDER — OMEGA-3S/DHA/EPA/FISH OIL/D3 300MG-1000
400 CAPSULE ORAL DAILY
Status: DISCONTINUED | OUTPATIENT
Start: 2023-07-08 | End: 2023-07-09 | Stop reason: HOSPADM

## 2023-07-08 RX ORDER — LOSARTAN POTASSIUM 25 MG/1
25 TABLET ORAL DAILY
Status: ON HOLD | COMMUNITY
End: 2023-07-09 | Stop reason: SDUPTHER

## 2023-07-08 RX ORDER — ASPIRIN 81 MG/1
81 TABLET ORAL DAILY
Status: DISCONTINUED | OUTPATIENT
Start: 2023-07-08 | End: 2023-07-09 | Stop reason: HOSPADM

## 2023-07-08 RX ORDER — ONDANSETRON 2 MG/ML
4 INJECTION INTRAMUSCULAR; INTRAVENOUS EVERY 6 HOURS PRN
Status: DISCONTINUED | OUTPATIENT
Start: 2023-07-08 | End: 2023-07-09 | Stop reason: HOSPADM

## 2023-07-08 RX ORDER — LORAZEPAM 2 MG/ML
1 INJECTION INTRAMUSCULAR ONCE
Status: COMPLETED | OUTPATIENT
Start: 2023-07-08 | End: 2023-07-08

## 2023-07-08 RX ORDER — LOSARTAN POTASSIUM 50 MG/1
25 TABLET ORAL DAILY
Status: DISCONTINUED | OUTPATIENT
Start: 2023-07-09 | End: 2023-07-09 | Stop reason: HOSPADM

## 2023-07-08 RX ORDER — LABETALOL HYDROCHLORIDE 5 MG/ML
10 INJECTION, SOLUTION INTRAVENOUS EVERY 10 MIN PRN
Status: DISCONTINUED | OUTPATIENT
Start: 2023-07-08 | End: 2023-07-09 | Stop reason: HOSPADM

## 2023-07-08 RX ORDER — ONDANSETRON 4 MG/1
4 TABLET, ORALLY DISINTEGRATING ORAL EVERY 8 HOURS PRN
Status: DISCONTINUED | OUTPATIENT
Start: 2023-07-08 | End: 2023-07-09 | Stop reason: HOSPADM

## 2023-07-08 RX ORDER — ATORVASTATIN CALCIUM 40 MG/1
40 TABLET, FILM COATED ORAL NIGHTLY
Status: DISCONTINUED | OUTPATIENT
Start: 2023-07-08 | End: 2023-07-09 | Stop reason: HOSPADM

## 2023-07-08 RX ADMIN — LORAZEPAM 1 MG: 2 INJECTION INTRAMUSCULAR; INTRAVENOUS at 18:01

## 2023-07-08 RX ADMIN — ATORVASTATIN CALCIUM 40 MG: 40 TABLET, FILM COATED ORAL at 21:12

## 2023-07-08 RX ADMIN — IOPAMIDOL 100 ML: 755 INJECTION, SOLUTION INTRAVENOUS at 09:59

## 2023-07-08 RX ADMIN — ASPIRIN 81 MG: 81 TABLET, COATED ORAL at 18:03

## 2023-07-08 ASSESSMENT — PAIN SCALES - GENERAL
PAINLEVEL_OUTOF10: 0
PAINLEVEL_OUTOF10: 0

## 2023-07-09 VITALS
WEIGHT: 117.06 LBS | OXYGEN SATURATION: 98 % | DIASTOLIC BLOOD PRESSURE: 58 MMHG | BODY MASS INDEX: 20.74 KG/M2 | HEIGHT: 63 IN | SYSTOLIC BLOOD PRESSURE: 145 MMHG | HEART RATE: 67 BPM | RESPIRATION RATE: 18 BRPM | TEMPERATURE: 97.5 F

## 2023-07-09 LAB
ANION GAP SERPL CALC-SCNC: 6 MMOL/L (ref 5–15)
BASOPHILS # BLD: 0.1 K/UL (ref 0–0.1)
BASOPHILS NFR BLD: 2 % (ref 0–1)
BUN SERPL-MCNC: 13 MG/DL (ref 6–20)
BUN/CREAT SERPL: 27 (ref 12–20)
CALCIUM SERPL-MCNC: 8.9 MG/DL (ref 8.5–10.1)
CHLORIDE SERPL-SCNC: 110 MMOL/L (ref 97–108)
CHOLEST SERPL-MCNC: 102 MG/DL
CO2 SERPL-SCNC: 23 MMOL/L (ref 21–32)
CREAT SERPL-MCNC: 0.48 MG/DL (ref 0.55–1.02)
DIFFERENTIAL METHOD BLD: ABNORMAL
EOSINOPHIL # BLD: 0.2 K/UL (ref 0–0.4)
EOSINOPHIL NFR BLD: 3 % (ref 0–7)
ERYTHROCYTE [DISTWIDTH] IN BLOOD BY AUTOMATED COUNT: 13.9 % (ref 11.5–14.5)
EST. AVERAGE GLUCOSE BLD GHB EST-MCNC: 100 MG/DL
GLUCOSE SERPL-MCNC: 94 MG/DL (ref 65–100)
HBA1C MFR BLD: 5.1 % (ref 4–5.6)
HCT VFR BLD AUTO: 40.5 % (ref 35–47)
HDLC SERPL-MCNC: 55 MG/DL
HDLC SERPL: 1.9 (ref 0–5)
HGB BLD-MCNC: 13 G/DL (ref 11.5–16)
IMM GRANULOCYTES # BLD AUTO: 0 K/UL (ref 0–0.04)
IMM GRANULOCYTES NFR BLD AUTO: 0 % (ref 0–0.5)
LDLC SERPL CALC-MCNC: 36.8 MG/DL (ref 0–100)
LYMPHOCYTES # BLD: 2.1 K/UL (ref 0.8–3.5)
LYMPHOCYTES NFR BLD: 39 % (ref 12–49)
MCH RBC QN AUTO: 28.3 PG (ref 26–34)
MCHC RBC AUTO-ENTMCNC: 32.1 G/DL (ref 30–36.5)
MCV RBC AUTO: 88 FL (ref 80–99)
MONOCYTES # BLD: 0.4 K/UL (ref 0–1)
MONOCYTES NFR BLD: 7 % (ref 5–13)
NEUTS SEG # BLD: 2.6 K/UL (ref 1.8–8)
NEUTS SEG NFR BLD: 49 % (ref 32–75)
NRBC # BLD: 0 K/UL (ref 0–0.01)
NRBC BLD-RTO: 0 PER 100 WBC
PLATELET # BLD AUTO: 225 K/UL (ref 150–400)
PMV BLD AUTO: 10.4 FL (ref 8.9–12.9)
POTASSIUM SERPL-SCNC: 4.1 MMOL/L (ref 3.5–5.1)
RBC # BLD AUTO: 4.6 M/UL (ref 3.8–5.2)
SODIUM SERPL-SCNC: 139 MMOL/L (ref 136–145)
TRIGL SERPL-MCNC: 51 MG/DL
VLDLC SERPL CALC-MCNC: 10.2 MG/DL
WBC # BLD AUTO: 5.3 K/UL (ref 3.6–11)

## 2023-07-09 PROCEDURE — 85025 COMPLETE CBC W/AUTO DIFF WBC: CPT

## 2023-07-09 PROCEDURE — 97116 GAIT TRAINING THERAPY: CPT

## 2023-07-09 PROCEDURE — 97165 OT EVAL LOW COMPLEX 30 MIN: CPT

## 2023-07-09 PROCEDURE — 97161 PT EVAL LOW COMPLEX 20 MIN: CPT

## 2023-07-09 PROCEDURE — 80061 LIPID PANEL: CPT

## 2023-07-09 PROCEDURE — 83036 HEMOGLOBIN GLYCOSYLATED A1C: CPT

## 2023-07-09 PROCEDURE — 80048 BASIC METABOLIC PNL TOTAL CA: CPT

## 2023-07-09 PROCEDURE — 6370000000 HC RX 637 (ALT 250 FOR IP): Performed by: INTERNAL MEDICINE

## 2023-07-09 PROCEDURE — 6360000002 HC RX W HCPCS: Performed by: INTERNAL MEDICINE

## 2023-07-09 PROCEDURE — 36415 COLL VENOUS BLD VENIPUNCTURE: CPT

## 2023-07-09 RX ORDER — LOSARTAN POTASSIUM 25 MG/1
50 TABLET ORAL DAILY
Qty: 30 TABLET | Refills: 3 | Status: SHIPPED | OUTPATIENT
Start: 2023-07-09

## 2023-07-09 RX ORDER — ASPIRIN 81 MG/1
81 TABLET ORAL DAILY
Qty: 30 TABLET | Refills: 3 | Status: SHIPPED | OUTPATIENT
Start: 2023-07-10

## 2023-07-09 RX ADMIN — CHOLECALCIFEROL TAB 10 MCG (400 UNIT) 400 UNITS: 10 TAB at 08:31

## 2023-07-09 RX ADMIN — ASPIRIN 81 MG: 81 TABLET, COATED ORAL at 08:19

## 2023-07-09 RX ADMIN — LOSARTAN POTASSIUM 25 MG: 50 TABLET, FILM COATED ORAL at 08:19

## 2023-07-09 RX ADMIN — ENOXAPARIN SODIUM 40 MG: 100 INJECTION SUBCUTANEOUS at 08:19

## 2023-07-09 ASSESSMENT — PAIN SCALES - GENERAL: PAINLEVEL_OUTOF10: 0

## 2023-07-11 PROBLEM — I67.4 HYPERTENSIVE ENCEPHALOPATHY: Status: ACTIVE | Noted: 2023-07-11

## 2023-08-01 ENCOUNTER — TRANSCRIBE ORDERS (OUTPATIENT)
Facility: HOSPITAL | Age: 73
End: 2023-08-01

## 2023-08-01 DIAGNOSIS — Z12.31 BREAST CANCER SCREENING BY MAMMOGRAM: Primary | ICD-10-CM

## 2023-08-29 ENCOUNTER — HOSPITAL ENCOUNTER (OUTPATIENT)
Facility: HOSPITAL | Age: 73
Discharge: HOME | End: 2023-09-01
Attending: FAMILY MEDICINE
Payer: MEDICARE

## 2023-08-29 VITALS — WEIGHT: 117 LBS | BODY MASS INDEX: 20.73 KG/M2 | HEIGHT: 63 IN

## 2023-08-29 DIAGNOSIS — Z12.31 BREAST CANCER SCREENING BY MAMMOGRAM: ICD-10-CM

## 2023-08-29 PROCEDURE — 77063 BREAST TOMOSYNTHESIS BI: CPT

## 2024-01-18 ENCOUNTER — HOSPITAL ENCOUNTER (OUTPATIENT)
Facility: HOSPITAL | Age: 74
Discharge: HOME | End: 2024-01-18
Attending: STUDENT IN AN ORGANIZED HEALTH CARE EDUCATION/TRAINING PROGRAM
Payer: MEDICARE

## 2024-01-18 DIAGNOSIS — R10.9 ABDOMINAL PAIN, UNSPECIFIED ABDOMINAL LOCATION: ICD-10-CM

## 2024-01-18 LAB — CREAT BLD-MCNC: 0.7 MG/DL (ref 0.6–1.3)

## 2024-01-18 PROCEDURE — 74177 CT ABD & PELVIS W/CONTRAST: CPT

## 2024-01-18 PROCEDURE — 6360000004 HC RX CONTRAST MEDICATION: Performed by: STUDENT IN AN ORGANIZED HEALTH CARE EDUCATION/TRAINING PROGRAM

## 2024-01-18 PROCEDURE — 82565 ASSAY OF CREATININE: CPT

## 2024-01-18 RX ADMIN — IOPAMIDOL 100 ML: 755 INJECTION, SOLUTION INTRAVENOUS at 07:10

## 2024-09-19 ENCOUNTER — HOSPITAL ENCOUNTER (EMERGENCY)
Facility: HOSPITAL | Age: 74
Discharge: HOME | End: 2024-09-19
Attending: EMERGENCY MEDICINE
Payer: MEDICARE

## 2024-09-19 VITALS
HEIGHT: 62 IN | RESPIRATION RATE: 24 BRPM | WEIGHT: 115 LBS | HEART RATE: 70 BPM | TEMPERATURE: 98.8 F | SYSTOLIC BLOOD PRESSURE: 135 MMHG | DIASTOLIC BLOOD PRESSURE: 89 MMHG | OXYGEN SATURATION: 98 % | BODY MASS INDEX: 21.16 KG/M2

## 2024-09-19 DIAGNOSIS — H81.13 BENIGN PAROXYSMAL POSITIONAL VERTIGO DUE TO BILATERAL VESTIBULAR DISORDER: ICD-10-CM

## 2024-09-19 DIAGNOSIS — R42 DIZZINESS: Primary | ICD-10-CM

## 2024-09-19 LAB
ALBUMIN SERPL-MCNC: 3.5 G/DL (ref 3.5–5)
ALBUMIN/GLOB SERPL: 1.1 (ref 1.1–2.2)
ALP SERPL-CCNC: 32 U/L (ref 45–117)
ALT SERPL-CCNC: 48 U/L (ref 12–78)
ANION GAP SERPL CALC-SCNC: 5 MMOL/L (ref 2–12)
AST SERPL-CCNC: 46 U/L (ref 15–37)
BASOPHILS # BLD: 0.1 K/UL (ref 0–0.1)
BASOPHILS NFR BLD: 1 % (ref 0–1)
BILIRUB SERPL-MCNC: 1.1 MG/DL (ref 0.2–1)
BUN SERPL-MCNC: 10 MG/DL (ref 6–20)
BUN/CREAT SERPL: 16 (ref 12–20)
CALCIUM SERPL-MCNC: 8.8 MG/DL (ref 8.5–10.1)
CHLORIDE SERPL-SCNC: 103 MMOL/L (ref 97–108)
CO2 SERPL-SCNC: 27 MMOL/L (ref 21–32)
COMMENT:: NORMAL
COMMENT:: NORMAL
CREAT SERPL-MCNC: 0.63 MG/DL (ref 0.55–1.02)
DIFFERENTIAL METHOD BLD: ABNORMAL
EOSINOPHIL # BLD: 0.1 K/UL (ref 0–0.4)
EOSINOPHIL NFR BLD: 1 % (ref 0–7)
ERYTHROCYTE [DISTWIDTH] IN BLOOD BY AUTOMATED COUNT: 14.1 % (ref 11.5–14.5)
GLOBULIN SER CALC-MCNC: 3.3 G/DL (ref 2–4)
GLUCOSE SERPL-MCNC: 110 MG/DL (ref 65–100)
HCT VFR BLD AUTO: 40.4 % (ref 35–47)
HGB BLD-MCNC: 13.2 G/DL (ref 11.5–16)
IMM GRANULOCYTES # BLD AUTO: 0.1 K/UL (ref 0–0.04)
IMM GRANULOCYTES NFR BLD AUTO: 1 % (ref 0–0.5)
LYMPHOCYTES # BLD: 0.5 K/UL (ref 0.8–3.5)
LYMPHOCYTES NFR BLD: 10 % (ref 12–49)
MCH RBC QN AUTO: 28.1 PG (ref 26–34)
MCHC RBC AUTO-ENTMCNC: 32.7 G/DL (ref 30–36.5)
MCV RBC AUTO: 86 FL (ref 80–99)
MONOCYTES # BLD: 0.5 K/UL (ref 0–1)
MONOCYTES NFR BLD: 9 % (ref 5–13)
NEUTS SEG # BLD: 4 K/UL (ref 1.8–8)
NEUTS SEG NFR BLD: 78 % (ref 32–75)
NRBC # BLD: 0 K/UL (ref 0–0.01)
NRBC BLD-RTO: 0 PER 100 WBC
PLATELET # BLD AUTO: 223 K/UL (ref 150–400)
PMV BLD AUTO: 10.6 FL (ref 8.9–12.9)
POTASSIUM SERPL-SCNC: 4.6 MMOL/L (ref 3.5–5.1)
PROT SERPL-MCNC: 6.8 G/DL (ref 6.4–8.2)
RBC # BLD AUTO: 4.7 M/UL (ref 3.8–5.2)
RBC MORPH BLD: ABNORMAL
SODIUM SERPL-SCNC: 135 MMOL/L (ref 136–145)
SPECIMEN HOLD: NORMAL
SPECIMEN HOLD: NORMAL
TROPONIN I SERPL HS-MCNC: 31 NG/L (ref 0–51)
WBC # BLD AUTO: 5.3 K/UL (ref 3.6–11)

## 2024-09-19 PROCEDURE — 80053 COMPREHEN METABOLIC PANEL: CPT

## 2024-09-19 PROCEDURE — 85025 COMPLETE CBC W/AUTO DIFF WBC: CPT

## 2024-09-19 PROCEDURE — 6370000000 HC RX 637 (ALT 250 FOR IP): Performed by: EMERGENCY MEDICINE

## 2024-09-19 PROCEDURE — 84484 ASSAY OF TROPONIN QUANT: CPT

## 2024-09-19 PROCEDURE — 36415 COLL VENOUS BLD VENIPUNCTURE: CPT

## 2024-09-19 PROCEDURE — 93005 ELECTROCARDIOGRAM TRACING: CPT | Performed by: EMERGENCY MEDICINE

## 2024-09-19 PROCEDURE — 99284 EMERGENCY DEPT VISIT MOD MDM: CPT

## 2024-09-19 RX ORDER — ATORVASTATIN CALCIUM 20 MG/1
20 TABLET, FILM COATED ORAL DAILY
COMMUNITY

## 2024-09-19 RX ORDER — MECLIZINE HYDROCHLORIDE 25 MG/1
25 TABLET ORAL 3 TIMES DAILY PRN
Qty: 30 TABLET | Refills: 0 | Status: SHIPPED | OUTPATIENT
Start: 2024-09-19 | End: 2024-09-29

## 2024-09-19 RX ORDER — ONDANSETRON 4 MG/1
4 TABLET, ORALLY DISINTEGRATING ORAL ONCE
Status: COMPLETED | OUTPATIENT
Start: 2024-09-19 | End: 2024-09-19

## 2024-09-19 RX ORDER — ONDANSETRON 4 MG/1
4 TABLET, ORALLY DISINTEGRATING ORAL EVERY 8 HOURS PRN
Qty: 20 TABLET | Refills: 0 | Status: SHIPPED | OUTPATIENT
Start: 2024-09-19

## 2024-09-19 RX ORDER — MECLIZINE HCL 12.5 MG 12.5 MG/1
25 TABLET ORAL ONCE
Status: COMPLETED | OUTPATIENT
Start: 2024-09-19 | End: 2024-09-19

## 2024-09-19 RX ADMIN — MECLIZINE 25 MG: 12.5 TABLET ORAL at 10:43

## 2024-09-19 RX ADMIN — ONDANSETRON 4 MG: 4 TABLET, ORALLY DISINTEGRATING ORAL at 10:44

## 2024-09-20 LAB
EKG ATRIAL RATE: 62 BPM
EKG DIAGNOSIS: NORMAL
EKG P AXIS: 49 DEGREES
EKG P-R INTERVAL: 134 MS
EKG Q-T INTERVAL: 398 MS
EKG QRS DURATION: 84 MS
EKG QTC CALCULATION (BAZETT): 403 MS
EKG R AXIS: 64 DEGREES
EKG T AXIS: 55 DEGREES
EKG VENTRICULAR RATE: 62 BPM

## 2024-11-20 ENCOUNTER — HOSPITAL ENCOUNTER (OUTPATIENT)
Facility: HOSPITAL | Age: 74
Discharge: HOME | End: 2024-11-23
Payer: MEDICARE

## 2024-11-20 VITALS — HEIGHT: 62 IN | WEIGHT: 115.08 LBS | BODY MASS INDEX: 21.18 KG/M2

## 2024-11-20 DIAGNOSIS — Z78.0 ASYMPTOMATIC MENOPAUSAL STATE: ICD-10-CM

## 2024-11-20 DIAGNOSIS — Z12.39 ENCOUNTER FOR OTHER SCREENING FOR MALIGNANT NEOPLASM OF BREAST: ICD-10-CM

## 2024-11-20 PROCEDURE — 77063 BREAST TOMOSYNTHESIS BI: CPT

## 2024-11-20 PROCEDURE — 77080 DXA BONE DENSITY AXIAL: CPT

## 2025-01-21 ENCOUNTER — HOSPITAL ENCOUNTER (OUTPATIENT)
Facility: HOSPITAL | Age: 75
Discharge: HOME | End: 2025-01-24
Payer: MEDICARE

## 2025-01-21 ENCOUNTER — TRANSCRIBE ORDERS (OUTPATIENT)
Facility: HOSPITAL | Age: 75
End: 2025-01-21

## 2025-01-21 DIAGNOSIS — I49.5 SICK SINUS SYNDROME (HCC): Primary | ICD-10-CM

## 2025-01-21 DIAGNOSIS — I49.5 SICK SINUS SYNDROME (HCC): ICD-10-CM

## 2025-01-21 PROCEDURE — 71046 X-RAY EXAM CHEST 2 VIEWS: CPT

## 2025-04-09 ENCOUNTER — TRANSCRIBE ORDERS (OUTPATIENT)
Facility: HOSPITAL | Age: 75
End: 2025-04-09

## 2025-04-09 DIAGNOSIS — R51.9 NONINTRACTABLE HEADACHE, UNSPECIFIED CHRONICITY PATTERN, UNSPECIFIED HEADACHE TYPE: Primary | ICD-10-CM

## 2025-06-30 ENCOUNTER — APPOINTMENT (OUTPATIENT)
Facility: HOSPITAL | Age: 75
End: 2025-06-30
Payer: MEDICARE

## 2025-06-30 ENCOUNTER — HOSPITAL ENCOUNTER (EMERGENCY)
Facility: HOSPITAL | Age: 75
Discharge: HOME | End: 2025-06-30
Attending: EMERGENCY MEDICINE
Payer: MEDICARE

## 2025-06-30 VITALS
SYSTOLIC BLOOD PRESSURE: 135 MMHG | DIASTOLIC BLOOD PRESSURE: 48 MMHG | WEIGHT: 115 LBS | OXYGEN SATURATION: 97 % | HEIGHT: 62 IN | BODY MASS INDEX: 21.16 KG/M2 | TEMPERATURE: 98.1 F | HEART RATE: 55 BPM | RESPIRATION RATE: 13 BRPM

## 2025-06-30 DIAGNOSIS — R07.89 ATYPICAL CHEST PAIN: Primary | ICD-10-CM

## 2025-06-30 DIAGNOSIS — R07.9 ACUTE CHEST PAIN: ICD-10-CM

## 2025-06-30 DIAGNOSIS — I10 ESSENTIAL HYPERTENSION: ICD-10-CM

## 2025-06-30 DIAGNOSIS — R00.2 PALPITATIONS: ICD-10-CM

## 2025-06-30 LAB
ALBUMIN SERPL-MCNC: 4 G/DL (ref 3.5–5)
ALBUMIN/GLOB SERPL: 1.1 (ref 1.1–2.2)
ALP SERPL-CCNC: 37 U/L (ref 45–117)
ALT SERPL-CCNC: 40 U/L (ref 12–78)
ANION GAP SERPL CALC-SCNC: 3 MMOL/L (ref 2–12)
AST SERPL-CCNC: 22 U/L (ref 15–37)
BASOPHILS # BLD: 0.07 K/UL (ref 0–0.1)
BASOPHILS NFR BLD: 1.4 % (ref 0–1)
BILIRUB SERPL-MCNC: 1 MG/DL (ref 0.2–1)
BUN SERPL-MCNC: 12 MG/DL (ref 6–20)
BUN/CREAT SERPL: 21 (ref 12–20)
CALCIUM SERPL-MCNC: 9.5 MG/DL (ref 8.5–10.1)
CHLORIDE SERPL-SCNC: 105 MMOL/L (ref 97–108)
CO2 SERPL-SCNC: 30 MMOL/L (ref 21–32)
CREAT SERPL-MCNC: 0.57 MG/DL (ref 0.55–1.02)
DIFFERENTIAL METHOD BLD: ABNORMAL
EKG ATRIAL RATE: 63 BPM
EKG DIAGNOSIS: NORMAL
EKG P AXIS: 118 DEGREES
EKG P-R INTERVAL: 138 MS
EKG Q-T INTERVAL: 426 MS
EKG QRS DURATION: 84 MS
EKG QTC CALCULATION (BAZETT): 435 MS
EKG R AXIS: 113 DEGREES
EKG T AXIS: 106 DEGREES
EKG VENTRICULAR RATE: 63 BPM
EOSINOPHIL # BLD: 0.05 K/UL (ref 0–0.4)
EOSINOPHIL NFR BLD: 1 % (ref 0–7)
ERYTHROCYTE [DISTWIDTH] IN BLOOD BY AUTOMATED COUNT: 13.6 % (ref 11.5–14.5)
FLUAV RNA SPEC QL NAA+PROBE: NOT DETECTED
FLUBV RNA SPEC QL NAA+PROBE: NOT DETECTED
GLOBULIN SER CALC-MCNC: 3.7 G/DL (ref 2–4)
GLUCOSE SERPL-MCNC: 109 MG/DL (ref 65–100)
HCT VFR BLD AUTO: 41.6 % (ref 35–47)
HGB BLD-MCNC: 13.5 G/DL (ref 11.5–16)
IMM GRANULOCYTES # BLD AUTO: 0.01 K/UL (ref 0–0.04)
IMM GRANULOCYTES NFR BLD AUTO: 0.2 % (ref 0–0.5)
LIPASE SERPL-CCNC: 33 U/L (ref 13–75)
LYMPHOCYTES # BLD: 1.29 K/UL (ref 0.8–3.5)
LYMPHOCYTES NFR BLD: 25 % (ref 12–49)
MCH RBC QN AUTO: 28.4 PG (ref 26–34)
MCHC RBC AUTO-ENTMCNC: 32.5 G/DL (ref 30–36.5)
MCV RBC AUTO: 87.4 FL (ref 80–99)
MONOCYTES # BLD: 0.29 K/UL (ref 0–1)
MONOCYTES NFR BLD: 5.6 % (ref 5–13)
NEUTS SEG # BLD: 3.45 K/UL (ref 1.8–8)
NEUTS SEG NFR BLD: 66.8 % (ref 32–75)
NRBC # BLD: 0 K/UL (ref 0–0.01)
NRBC BLD-RTO: 0 PER 100 WBC
NT PRO BNP: 36 PG/ML
PLATELET # BLD AUTO: 238 K/UL (ref 150–400)
PMV BLD AUTO: 10.4 FL (ref 8.9–12.9)
POTASSIUM SERPL-SCNC: 4.2 MMOL/L (ref 3.5–5.1)
PROT SERPL-MCNC: 7.7 G/DL (ref 6.4–8.2)
RBC # BLD AUTO: 4.76 M/UL (ref 3.8–5.2)
SARS-COV-2 RNA RESP QL NAA+PROBE: NOT DETECTED
SODIUM SERPL-SCNC: 138 MMOL/L (ref 136–145)
SOURCE: NORMAL
TROPONIN I SERPL HS-MCNC: 53 NG/L (ref 0–51)
TROPONIN I SERPL HS-MCNC: 55 NG/L (ref 0–51)
WBC # BLD AUTO: 5.2 K/UL (ref 3.6–11)

## 2025-06-30 PROCEDURE — 99285 EMERGENCY DEPT VISIT HI MDM: CPT

## 2025-06-30 PROCEDURE — 71046 X-RAY EXAM CHEST 2 VIEWS: CPT

## 2025-06-30 PROCEDURE — 83690 ASSAY OF LIPASE: CPT

## 2025-06-30 PROCEDURE — 80053 COMPREHEN METABOLIC PANEL: CPT

## 2025-06-30 PROCEDURE — 93005 ELECTROCARDIOGRAM TRACING: CPT | Performed by: EMERGENCY MEDICINE

## 2025-06-30 PROCEDURE — 71045 X-RAY EXAM CHEST 1 VIEW: CPT

## 2025-06-30 PROCEDURE — 87636 SARSCOV2 & INF A&B AMP PRB: CPT

## 2025-06-30 PROCEDURE — 36415 COLL VENOUS BLD VENIPUNCTURE: CPT

## 2025-06-30 PROCEDURE — 6370000000 HC RX 637 (ALT 250 FOR IP): Performed by: EMERGENCY MEDICINE

## 2025-06-30 PROCEDURE — 85025 COMPLETE CBC W/AUTO DIFF WBC: CPT

## 2025-06-30 PROCEDURE — 84484 ASSAY OF TROPONIN QUANT: CPT

## 2025-06-30 PROCEDURE — 83880 ASSAY OF NATRIURETIC PEPTIDE: CPT

## 2025-06-30 RX ORDER — LABETALOL HYDROCHLORIDE 5 MG/ML
10 INJECTION, SOLUTION INTRAVENOUS ONCE
Status: DISCONTINUED | OUTPATIENT
Start: 2025-06-30 | End: 2025-06-30

## 2025-06-30 RX ORDER — CLONIDINE HYDROCHLORIDE 0.1 MG/1
0.1 TABLET ORAL
Status: COMPLETED | OUTPATIENT
Start: 2025-06-30 | End: 2025-06-30

## 2025-06-30 RX ADMIN — CLONIDINE HYDROCHLORIDE 0.1 MG: 0.1 TABLET ORAL at 21:30

## 2025-06-30 ASSESSMENT — PAIN DESCRIPTION - PAIN TYPE: TYPE: ACUTE PAIN

## 2025-06-30 ASSESSMENT — PAIN DESCRIPTION - DESCRIPTORS: DESCRIPTORS: PRESSURE

## 2025-06-30 ASSESSMENT — PAIN SCALES - GENERAL: PAINLEVEL_OUTOF10: 8

## 2025-06-30 ASSESSMENT — PAIN DESCRIPTION - LOCATION: LOCATION: CHEST

## 2025-06-30 ASSESSMENT — ENCOUNTER SYMPTOMS
NAUSEA: 0
ABDOMINAL PAIN: 0
CHEST TIGHTNESS: 1
VOMITING: 0
SHORTNESS OF BREATH: 0
DIARRHEA: 0

## 2025-06-30 ASSESSMENT — PAIN - FUNCTIONAL ASSESSMENT
PAIN_FUNCTIONAL_ASSESSMENT: PREVENTS OR INTERFERES SOME ACTIVE ACTIVITIES AND ADLS
PAIN_FUNCTIONAL_ASSESSMENT: 0-10

## 2025-06-30 ASSESSMENT — PAIN DESCRIPTION - ONSET: ONSET: ON-GOING

## 2025-06-30 ASSESSMENT — PAIN DESCRIPTION - ORIENTATION: ORIENTATION: MID

## 2025-06-30 ASSESSMENT — LIFESTYLE VARIABLES
HOW OFTEN DO YOU HAVE A DRINK CONTAINING ALCOHOL: NEVER
HOW MANY STANDARD DRINKS CONTAINING ALCOHOL DO YOU HAVE ON A TYPICAL DAY: PATIENT DOES NOT DRINK

## 2025-06-30 ASSESSMENT — PAIN DESCRIPTION - FREQUENCY: FREQUENCY: INTERMITTENT

## 2025-06-30 NOTE — ED PROVIDER NOTES
Amount and/or Complexity of Data Reviewed  HIGH complexity decision making performed   Presentation: ACUTE and SEVERE (giving consideration to thing such as systemic symptoms, impact on quality of life, morbidity and mortality).  Clinical lab tests: ordered as appropriate, reviewed and interpreted by me   Radiology studies: (if indicated) ordered as appropriate & images reviewed and interpreted by me  Reviewed and summarized all available results: yes  Review and summarize available past medical records: yes  Independent visualization of images, ECGs, or specimens (if performed): yes  Independent history obtained from other sources such as family, EMS, bystanders, law enforcement (if available): yes    FINAL IMPRESSION     1. Atypical chest pain    2. Acute chest pain    3. Palpitations    4. Essential hypertension         DISPOSITION/PLAN      Discharge to home     Kate William's  results have been reviewed with her.  She has been counseled regarding her diagnosis, treatment, and plan.   I have discussed with the patient and/or caregiver my initial clinical impression which is based on an evidence-based clinical evaluation of the patient and interpretation of available results. Involved patient and/or caregiver in management, treatment options and final disposition. Patient/caregiver verbalize understanding of and agreement. We agree that at this time additional imaging or blood work is not emergently needed. She verbally conveys understanding and agreement of the signs, symptoms, diagnosis, treatment and prognosis and additionally agrees to follow up as discussed.  She also agrees with the care-plan.  I believe that all of her questions have been answered.  I have also provided discharge instructions for her that include: educational information regarding their diagnosis and treatment, and list of reasons why they would want to return to the ED prior to their follow-up appointment, should her condition

## 2025-07-01 LAB
EKG ATRIAL RATE: 65 BPM
EKG DIAGNOSIS: NORMAL
EKG P AXIS: 75 DEGREES
EKG P-R INTERVAL: 132 MS
EKG Q-T INTERVAL: 396 MS
EKG QRS DURATION: 86 MS
EKG QTC CALCULATION (BAZETT): 411 MS
EKG R AXIS: 59 DEGREES
EKG T AXIS: 50 DEGREES
EKG VENTRICULAR RATE: 65 BPM

## 2025-07-01 NOTE — DISCHARGE INSTRUCTIONS
It was a pleasure taking care of you in our Emergency Department today.  We know that when you come to [facility], you are entrusting us with your health, comfort, and safety.  Our physicians and nurses honor that trust, and truly appreciate the opportunity to care for you and your loved ones.      We also value your feedback.  If you receive a survey about your Emergency Department experience today, please fill it out.  We care about our patients' feedback, and we listen to what you have to say.  Thank you!      Dr. Roselyn Juan MD.

## 2025-08-21 SDOH — HEALTH STABILITY: PHYSICAL HEALTH: ON AVERAGE, HOW MANY DAYS PER WEEK DO YOU ENGAGE IN MODERATE TO STRENUOUS EXERCISE (LIKE A BRISK WALK)?: 2 DAYS

## 2025-08-21 SDOH — HEALTH STABILITY: PHYSICAL HEALTH: ON AVERAGE, HOW MANY MINUTES DO YOU ENGAGE IN EXERCISE AT THIS LEVEL?: 30 MIN

## 2025-08-26 ENCOUNTER — OFFICE VISIT (OUTPATIENT)
Age: 75
End: 2025-08-26
Payer: MEDICARE

## 2025-08-26 ENCOUNTER — CLINICAL DOCUMENTATION (OUTPATIENT)
Age: 75
End: 2025-08-26

## 2025-08-26 VITALS
HEART RATE: 64 BPM | TEMPERATURE: 98 F | BODY MASS INDEX: 20.47 KG/M2 | WEIGHT: 108.4 LBS | HEIGHT: 61 IN | RESPIRATION RATE: 16 BRPM | SYSTOLIC BLOOD PRESSURE: 134 MMHG | DIASTOLIC BLOOD PRESSURE: 66 MMHG | OXYGEN SATURATION: 96 %

## 2025-08-26 DIAGNOSIS — R00.1 BRADYCARDIA: ICD-10-CM

## 2025-08-26 DIAGNOSIS — Z00.00 ROUTINE GENERAL MEDICAL EXAMINATION AT A HEALTH CARE FACILITY: Primary | ICD-10-CM

## 2025-08-26 DIAGNOSIS — R19.7 DIARRHEA, UNSPECIFIED TYPE: ICD-10-CM

## 2025-08-26 DIAGNOSIS — Z00.00 ROUTINE GENERAL MEDICAL EXAMINATION AT A HEALTH CARE FACILITY: ICD-10-CM

## 2025-08-26 DIAGNOSIS — Z86.73 HISTORY OF STROKE: ICD-10-CM

## 2025-08-26 DIAGNOSIS — E78.5 DYSLIPIDEMIA: ICD-10-CM

## 2025-08-26 DIAGNOSIS — Z91.81 AT HIGH RISK FOR FALLS: ICD-10-CM

## 2025-08-26 DIAGNOSIS — Z12.31 ENCOUNTER FOR SCREENING MAMMOGRAM FOR MALIGNANT NEOPLASM OF BREAST: ICD-10-CM

## 2025-08-26 PROBLEM — I63.9 ACUTE CVA (CEREBROVASCULAR ACCIDENT) (HCC): Status: RESOLVED | Noted: 2023-07-08 | Resolved: 2025-08-26

## 2025-08-26 PROCEDURE — 99214 OFFICE O/P EST MOD 30 MIN: CPT | Performed by: INTERNAL MEDICINE

## 2025-08-26 PROCEDURE — 99204 OFFICE O/P NEW MOD 45 MIN: CPT | Performed by: INTERNAL MEDICINE

## 2025-08-26 RX ORDER — LOSARTAN POTASSIUM 25 MG/1
25 TABLET ORAL 2 TIMES DAILY
Qty: 30 TABLET | Refills: 0 | Status: SHIPPED | OUTPATIENT
Start: 2025-08-26

## 2025-08-26 RX ORDER — LOPERAMIDE HYDROCHLORIDE 2 MG/1
2 CAPSULE ORAL 4 TIMES DAILY PRN
Qty: 30 CAPSULE | Refills: 2 | Status: SHIPPED | OUTPATIENT
Start: 2025-08-26 | End: 2025-09-05

## 2025-08-26 SDOH — ECONOMIC STABILITY: FOOD INSECURITY: WITHIN THE PAST 12 MONTHS, YOU WORRIED THAT YOUR FOOD WOULD RUN OUT BEFORE YOU GOT MONEY TO BUY MORE.: NEVER TRUE

## 2025-08-26 SDOH — ECONOMIC STABILITY: FOOD INSECURITY: WITHIN THE PAST 12 MONTHS, THE FOOD YOU BOUGHT JUST DIDN'T LAST AND YOU DIDN'T HAVE MONEY TO GET MORE.: NEVER TRUE

## 2025-08-26 ASSESSMENT — PATIENT HEALTH QUESTIONNAIRE - PHQ9
SUM OF ALL RESPONSES TO PHQ QUESTIONS 1-9: 0
1. LITTLE INTEREST OR PLEASURE IN DOING THINGS: NOT AT ALL
2. FEELING DOWN, DEPRESSED OR HOPELESS: NOT AT ALL
SUM OF ALL RESPONSES TO PHQ QUESTIONS 1-9: 0

## 2025-08-27 DIAGNOSIS — R19.7 DIARRHEA, UNSPECIFIED TYPE: Primary | ICD-10-CM

## 2025-08-27 LAB
ALBUMIN SERPL-MCNC: 4.2 G/DL (ref 3.5–5.2)
ALBUMIN/GLOB SERPL: 1.7 (ref 1.1–2.2)
ALP SERPL-CCNC: 32 U/L (ref 35–104)
ALT SERPL-CCNC: 29 U/L (ref 10–35)
ANION GAP SERPL CALC-SCNC: 11 MMOL/L (ref 2–14)
AST SERPL-CCNC: 26 U/L (ref 10–35)
BASOPHILS # BLD: 0.08 K/UL (ref 0–0.1)
BASOPHILS NFR BLD: 1.6 % (ref 0–1)
BILIRUB SERPL-MCNC: 0.9 MG/DL (ref 0–1.2)
BUN SERPL-MCNC: 14 MG/DL (ref 8–23)
CALCIUM SERPL-MCNC: 9.8 MG/DL (ref 8.8–10.2)
CHLORIDE SERPL-SCNC: 100 MMOL/L (ref 98–107)
CHOLEST SERPL-MCNC: 101 MG/DL (ref 0–200)
CO2 SERPL-SCNC: 27 MMOL/L (ref 20–29)
CREAT SERPL-MCNC: 0.55 MG/DL (ref 0.6–1)
DIFFERENTIAL METHOD BLD: ABNORMAL
EOSINOPHIL # BLD: 0.05 K/UL (ref 0–0.4)
EOSINOPHIL NFR BLD: 1 % (ref 0–7)
ERYTHROCYTE [DISTWIDTH] IN BLOOD BY AUTOMATED COUNT: 13.6 % (ref 11.5–14.5)
EST. AVERAGE GLUCOSE BLD GHB EST-MCNC: 119 MG/DL
GLOBULIN SER CALC-MCNC: 2.6 G/DL (ref 2–4)
GLUCOSE SERPL-MCNC: 99 MG/DL (ref 65–100)
HBA1C MFR BLD: 5.8 % (ref 4–5.6)
HCT VFR BLD AUTO: 41.6 % (ref 35–47)
HDLC SERPL-MCNC: 54 MG/DL (ref 40–60)
HDLC SERPL: 1.9 (ref 0–5)
HGB BLD-MCNC: 13.4 G/DL (ref 11.5–16)
IMM GRANULOCYTES # BLD AUTO: 0.01 K/UL (ref 0–0.04)
IMM GRANULOCYTES NFR BLD AUTO: 0.2 % (ref 0–0.5)
LDLC SERPL CALC-MCNC: 36 MG/DL (ref 0–100)
LYMPHOCYTES # BLD: 1.3 K/UL (ref 0.8–3.5)
LYMPHOCYTES NFR BLD: 25.6 % (ref 12–49)
MCH RBC QN AUTO: 28 PG (ref 26–34)
MCHC RBC AUTO-ENTMCNC: 32.2 G/DL (ref 30–36.5)
MCV RBC AUTO: 86.8 FL (ref 80–99)
MONOCYTES # BLD: 0.32 K/UL (ref 0–1)
MONOCYTES NFR BLD: 6.3 % (ref 5–13)
NEUTS SEG # BLD: 3.32 K/UL (ref 1.8–8)
NEUTS SEG NFR BLD: 65.3 % (ref 32–75)
NRBC # BLD: 0 K/UL (ref 0–0.01)
NRBC BLD-RTO: 0 PER 100 WBC
PLATELET # BLD AUTO: 261 K/UL (ref 150–400)
PMV BLD AUTO: 11.3 FL (ref 8.9–12.9)
POTASSIUM SERPL-SCNC: 5.1 MMOL/L (ref 3.5–5.1)
PROT SERPL-MCNC: 6.8 G/DL (ref 6.4–8.3)
RBC # BLD AUTO: 4.79 M/UL (ref 3.8–5.2)
SODIUM SERPL-SCNC: 138 MMOL/L (ref 136–145)
TRIGL SERPL-MCNC: 51 MG/DL (ref 0–150)
TSH, 3RD GENERATION: 0.65 UIU/ML (ref 0.27–4.2)
VLDLC SERPL CALC-MCNC: 10 MG/DL
WBC # BLD AUTO: 5.1 K/UL (ref 3.6–11)

## 2025-08-28 ENCOUNTER — TELEPHONE (OUTPATIENT)
Age: 75
End: 2025-08-28

## 2025-08-28 LAB
GLIADIN PEPTIDE IGA SER-ACNC: 5 UNITS (ref 0–19)
GLIADIN PEPTIDE IGG SER-ACNC: 2 UNITS (ref 0–19)
IGA SERPL-MCNC: 161 MG/DL (ref 64–422)
TTG IGA SER-ACNC: <2 U/ML (ref 0–3)
TTG IGG SER-ACNC: 3 U/ML (ref 0–5)

## 2025-09-03 ENCOUNTER — RESULTS FOLLOW-UP (OUTPATIENT)
Age: 75
End: 2025-09-03

## (undated) DEVICE — NEEDLE HYPO 18GA L1.5IN PNK S STL HUB POLYPR SHLD REG BVL

## (undated) DEVICE — CONNECTOR TBNG AUX H2O JET DISP FOR OLY 160/180 SER

## (undated) DEVICE — FORCEPS BX L240CM JAW DIA2.8MM L CAP W/ NDL MIC MESH TOOTH

## (undated) DEVICE — AIRLIFE™ U/CONNECT-IT OXYGEN TUBING 7 FEET (2.1 M) CRUSH-RESISTANT OXYGEN TUBING, VINYL TIPPED: Brand: AIRLIFE™

## (undated) DEVICE — SYR 50ML SLIP TIP NSAF LF STRL --

## (undated) DEVICE — Device

## (undated) DEVICE — Z DISCONTINUED USE 2751540 TUBING IRRIG L10IN DISP PMP ENDOGATOR

## (undated) DEVICE — QUILTED PREMIUM COMFORT UNDERPAD,EXTRA HEAVY: Brand: WINGS

## (undated) DEVICE — Z DISCONTINUED NO SUB IDED SET EXTN W/ 4 W STPCOCK M SPIN LOK 36IN

## (undated) DEVICE — 1200 GUARD II KIT W/5MM TUBE W/O VAC TUBE: Brand: GUARDIAN

## (undated) DEVICE — WRISTBAND ID AD W1XL11.5IN RED POLY ALRG PREPRINTED PERM

## (undated) DEVICE — KIT IV STRT W CHLORAPREP PD 1ML

## (undated) DEVICE — CONTAINER SPEC 20 ML LID NEUT BUFF FORMALIN 10 % POLYPR STS

## (undated) DEVICE — CANN NASAL O2 CAPNOGRAPHY AD -- FILTERLINE

## (undated) DEVICE — ENDO CARRY-ON PROCEDURE KIT INCLUDES ENZYMATIC SPONGE, GAUZE, BIOHAZARD LABEL, TRAY, LUBRICANT, DIRTY SCOPE LABEL, WATER LABEL, TRAY, DRAWSTRING PAD, AND DEFENDO 4-PIECE KIT.: Brand: ENDO CARRY-ON PROCEDURE KIT

## (undated) DEVICE — BITE BLK ENDOSCP AD 54FR GRN POLYETH ENDOSCP W STRP SLD

## (undated) DEVICE — BAG BELONG PT PERS CLEAR HANDL

## (undated) DEVICE — CATH IV AUTOGRD BC BLU 22GA 25 -- INSYTE

## (undated) DEVICE — KENDALL RADIOLUCENT FOAM MONITORING ELECTRODE -RECTANGULAR SHAPE: Brand: KENDALL

## (undated) DEVICE — BAG SPEC BIOHZD LF 2MIL 6X10IN -- CONVERT TO ITEM 357326

## (undated) DEVICE — NEONATAL-ADULT SPO2 SENSOR: Brand: NELLCOR

## (undated) DEVICE — BW-412T DISP COMBO CLEANING BRUSH: Brand: SINGLE USE COMBINATION CLEANING BRUSH

## (undated) DEVICE — SOLIDIFIER FLUID 3000 CC ABSORB

## (undated) DEVICE — SET ADMIN 16ML TBNG L100IN 2 Y INJ SITE IV PIGGY BK DISP

## (undated) DEVICE — SYRINGE MED 20ML STD CLR PLAS LUERLOCK TIP N CTRL DISP